# Patient Record
Sex: MALE | Race: WHITE | NOT HISPANIC OR LATINO | Employment: OTHER | URBAN - METROPOLITAN AREA
[De-identification: names, ages, dates, MRNs, and addresses within clinical notes are randomized per-mention and may not be internally consistent; named-entity substitution may affect disease eponyms.]

---

## 2017-03-21 ENCOUNTER — HOSPITAL ENCOUNTER (OUTPATIENT)
Dept: RADIOLOGY | Facility: MEDICAL CENTER | Age: 67
End: 2017-03-21
Attending: NEUROLOGICAL SURGERY
Payer: MEDICARE

## 2017-03-21 VITALS — HEIGHT: 72 IN | WEIGHT: 238 LBS | BODY MASS INDEX: 32.23 KG/M2

## 2017-03-21 DIAGNOSIS — M54.5 LOW BACK PAIN, UNSPECIFIED BACK PAIN LATERALITY, UNSPECIFIED CHRONICITY, WITH SCIATICA PRESENCE UNSPECIFIED: ICD-10-CM

## 2017-03-21 PROCEDURE — 72110 X-RAY EXAM L-2 SPINE 4/>VWS: CPT

## 2017-03-21 PROCEDURE — A9270 NON-COVERED ITEM OR SERVICE: HCPCS | Performed by: RADIOLOGY

## 2017-03-21 PROCEDURE — 700102 HCHG RX REV CODE 250 W/ 637 OVERRIDE(OP): Performed by: RADIOLOGY

## 2017-03-21 PROCEDURE — 72148 MRI LUMBAR SPINE W/O DYE: CPT

## 2017-03-21 RX ORDER — ALPRAZOLAM 1 MG/1
1 TABLET ORAL
Status: COMPLETED | OUTPATIENT
Start: 2017-03-21 | End: 2017-03-21

## 2017-03-21 RX ADMIN — ALPRAZOLAM 1 MG: 1 TABLET ORAL at 10:15

## 2017-03-21 ASSESSMENT — PAIN SCALES - GENERAL
PAINLEVEL_OUTOF10: 0
PAINLEVEL_OUTOF10: 0

## 2017-03-21 NOTE — IP AVS SNAPSHOT
" <p align=\"LEFT\"><IMG SRC=\"[email or web address]\" alt=\"Image\" WIDTH=\"50%\" HEIGHT=\"200\" BORDER=\"\"></p>      `           Juan Lubin   MRN: [id number]    Department:  [facility] - MRI [address]   Date of Visit:              `  Discharge Instructions       MRI ADULT DISCHARGE INSTRUCTIONS    You have been medicated today for your scan. Please follow the instructions below to ensure your safe recovery. If you have any questions or problems, feel free to call us at [phone] or [phone].     1.   Have someone stay with you to assist you as needed.    2.   Do not drive or operate any mechanical devices.    3.   Do not perform any activity that requires concentration. Make no major decisions over the next 24 hours.     4.   Be careful changing positions from laying down to sitting or standing, as you may become dizzy.     5.   Do not drink alcohol for 48 hours.    6.   There are no restrictions for eating your normal meals. Drink fluids.    7.   You may continue your usual medications for pain, tranquilizers, muscle relaxants or sedatives when awake.     8.   Tomorrow, you may continue your normal daily activities.     9.   Pressure dressing on 10 - 15 minutes. If swelling or bleeding occurs when removed, continue placing direct pressure on injection site for another 5 minutes, or until bleeding stops.     I have been informed of and understand the above discharge instructions. Alprazolam (XANAX) tablet  What is this medicine?  You were prescribed ALPRAZOLAM (al PRAMILLY mixon black) for the procedure you had today. This medication is a benzodiazepine. It is used to treat anxiety and panic attacks.  This medicine may be used for other purposes; ask your health care provider or pharmacist if you have questions.  What side effects may I notice from receiving this medicine?  Side effects that you should report to your doctor or health care professional as soon as possible:  • allergic reactions like skin rash, " itching or hives, swelling of the face, lips, or tongue  • confusion, forgetfulness  • depression  • difficulty sleeping  • difficulty speaking  • feeling faint or lightheaded, falls  • mood changes, excitability or aggressive behavior  • muscle cramps  • trouble passing urine or change in the amount of urine  • unusually weak or tired  Side effects that usually do not require medical attention (report to your doctor or health care professional if they continue or are bothersome):  • changes in appetite  • change in sex drive or performance  This list may not describe all possible side effects. Call your doctor for medical advice about side effects. You may report side effects to FDA at 7-537-GER-0411.         `       Diet / Nutrition:    Follow any diet instructions given to you by your doctor or the dietician, including how much salt (sodium) you are allowed each day.    If you are overweight, talk to your doctor about a weight reduction plan.    Activity:    Remain physically active following your doctor's instructions about exercise and activity.    Rest often.     Any time you become even a little tired or short of breath, SIT DOWN and rest.    Worsening Symptoms:    Report any of the following signs and symptoms to the doctor's office immediately:    *Pain of jaw, arm, or neck  *Chest pain not relieved by medication                               *Dizziness or loss of consciousness  *Difficulty breathing even when at rest   *More tired than usual                                       *Bleeding drainage or swelling of surgical site  *Swelling of feet, ankles, legs or stomach                 *Fever (>100ºF)  *Pink or blood tinged sputum  *Weight gain (3lbs/day or 5lbs /week)           *Shock from internal defibrillator (if applicable)  *Palpitations or irregular heartbeats                *Cool and/or numb extremities    Stroke Awareness    Common Risk Factors for Stroke include:    Age  Atrial  Fibrillation  Carotid Artery Stenosis  Diabetes Mellitus  Excessive alcohol consumption  High blood pressure  Overweight   Physical inactivity  Smoking    Warning signs and symptoms of a stroke include:    *Sudden numbness or weakness of the face, arm or leg (especially on one side of the body).  *Sudden confusion, trouble speaking or understanding.  *Sudden trouble seeing in one or both eyes.  *Sudden trouble walking, dizziness, loss of balance or coordination.Sudden severe headache with no known cause.    It is very important to get treatment quickly when a stroke occurs. If you experience any of the above warning signs, call 911 immediately.                    `     Quit Smoking / Tobacco Use:    I understand the use of any tobacco products increases my chance of suffering from future heart disease or stroke and could cause other illnesses which may shorten my life. Quitting the use of tobacco products is the single most important thing I can do to improve my health. For further information on smoking / tobacco cessation call a Toll Free Quit Line at 1-696.818.4738 (*[employer]) or 1-368.459.3833 (American Lung Association) or you can access the web based program at www.lungMeeps.org.    [place] Tobacco Users Help Line:  (779) 770-5597       Toll Free: 1-470.490.2759  Quit Tobacco Program [facility] (468)716-1962    Crisis Hotline:    [place] Crisis Hotline:  5-244-WRJEDUV or 1-126.639.1126    [place] Crisis Hotline:    1-356.295.5531 or 311-239-1868    Discharge Survey:   Thank you for choosing [facility]. We hope we did everything we could to make your hospital stay a pleasant one. You may be receiving a phone survey and we would appreciate your time and participation in answering the questions. Your input is very valuable to us in our efforts to improve our service to our patients and their families.        My signature on this form indicates that:    1. I have reviewed  and understand the above information.  2. My questions regarding this information have been answered to my satisfaction.  3. I have formulated a plan with my discharge nurse to obtain my prescribed medications for home.                   `           Patient or Caregiver Signature:  ____________________________________________________________    Date:  ____________________________________________________________       `

## 2017-03-21 NOTE — DISCHARGE INSTRUCTIONS
MRI ADULT DISCHARGE INSTRUCTIONS    You have been medicated today for your scan. Please follow the instructions below to ensure your safe recovery. If you have any questions or problems, feel free to call us at [phone] or [phone].     1.   Have someone stay with you to assist you as needed.    2.   Do not drive or operate any mechanical devices.    3.   Do not perform any activity that requires concentration. Make no major decisions over the next 24 hours.     4.   Be careful changing positions from laying down to sitting or standing, as you may become dizzy.     5.   Do not drink alcohol for 48 hours.    6.   There are no restrictions for eating your normal meals. Drink fluids.    7.   You may continue your usual medications for pain, tranquilizers, muscle relaxants or sedatives when awake.     8.   Tomorrow, you may continue your normal daily activities.     9.   Pressure dressing on 10 - 15 minutes. If swelling or bleeding occurs when removed, continue placing direct pressure on injection site for another 5 minutes, or until bleeding stops.     I have been informed of and understand the above discharge instructions. Alprazolam (XANAX) tablet  What is this medicine?  You were prescribed ALPRAZOLAM (al PRAY apurva black) for the procedure you had today. This medication is a benzodiazepine. It is used to treat anxiety and panic attacks.  This medicine may be used for other purposes; ask your health care provider or pharmacist if you have questions.  What side effects may I notice from receiving this medicine?  Side effects that you should report to your doctor or health care professional as soon as possible:  • allergic reactions like skin rash, itching or hives, swelling of the face, lips, or tongue  • confusion, forgetfulness  • depression  • difficulty sleeping  • difficulty speaking  • feeling faint or lightheaded, falls  • mood changes, excitability or aggressive behavior  • muscle cramps  • trouble passing urine  or change in the amount of urine  • unusually weak or tired  Side effects that usually do not require medical attention (report to your doctor or health care professional if they continue or are bothersome):  • changes in appetite  • change in sex drive or performance  This list may not describe all possible side effects. Call your doctor for medical advice about side effects. You may report side effects to FDA at 3-631-EPI-5473.

## 2017-06-15 ENCOUNTER — HOSPITAL ENCOUNTER (OUTPATIENT)
Dept: LAB | Facility: MEDICAL CENTER | Age: 67
End: 2017-06-15
Attending: FAMILY MEDICINE
Payer: MEDICARE

## 2017-06-15 LAB
ALBUMIN SERPL BCP-MCNC: 4.2 G/DL (ref 3.2–4.9)
ALBUMIN/GLOB SERPL: 1.4 G/DL
ALP SERPL-CCNC: 59 U/L (ref 30–99)
ALT SERPL-CCNC: 38 U/L (ref 2–50)
ANION GAP SERPL CALC-SCNC: 8 MMOL/L (ref 0–11.9)
AST SERPL-CCNC: 36 U/L (ref 12–45)
BASOPHILS # BLD AUTO: 1.2 % (ref 0–1.8)
BASOPHILS # BLD: 0.09 K/UL (ref 0–0.12)
BILIRUB SERPL-MCNC: 0.4 MG/DL (ref 0.1–1.5)
BUN SERPL-MCNC: 14 MG/DL (ref 8–22)
CALCIUM SERPL-MCNC: 9.2 MG/DL (ref 8.5–10.5)
CHLORIDE SERPL-SCNC: 102 MMOL/L (ref 96–112)
CHOLEST SERPL-MCNC: 140 MG/DL (ref 100–199)
CO2 SERPL-SCNC: 31 MMOL/L (ref 20–33)
CREAT SERPL-MCNC: 1 MG/DL (ref 0.5–1.4)
EOSINOPHIL # BLD AUTO: 0.19 K/UL (ref 0–0.51)
EOSINOPHIL NFR BLD: 2.6 % (ref 0–6.9)
ERYTHROCYTE [DISTWIDTH] IN BLOOD BY AUTOMATED COUNT: 43.8 FL (ref 35.9–50)
EST. AVERAGE GLUCOSE BLD GHB EST-MCNC: 134 MG/DL
GFR SERPL CREATININE-BSD FRML MDRD: >60 ML/MIN/1.73 M 2
GLOBULIN SER CALC-MCNC: 3 G/DL (ref 1.9–3.5)
GLUCOSE SERPL-MCNC: 95 MG/DL (ref 65–99)
HBA1C MFR BLD: 6.3 % (ref 0–5.6)
HCT VFR BLD AUTO: 44.2 % (ref 42–52)
HDLC SERPL-MCNC: 49 MG/DL
HGB BLD-MCNC: 13.8 G/DL (ref 14–18)
IMM GRANULOCYTES # BLD AUTO: 0.01 K/UL (ref 0–0.11)
IMM GRANULOCYTES NFR BLD AUTO: 0.1 % (ref 0–0.9)
LDLC SERPL CALC-MCNC: 73 MG/DL
LYMPHOCYTES # BLD AUTO: 2.57 K/UL (ref 1–4.8)
LYMPHOCYTES NFR BLD: 34.8 % (ref 22–41)
MCH RBC QN AUTO: 28.3 PG (ref 27–33)
MCHC RBC AUTO-ENTMCNC: 31.2 G/DL (ref 33.7–35.3)
MCV RBC AUTO: 90.8 FL (ref 81.4–97.8)
MONOCYTES # BLD AUTO: 0.58 K/UL (ref 0–0.85)
MONOCYTES NFR BLD AUTO: 7.8 % (ref 0–13.4)
NEUTROPHILS # BLD AUTO: 3.95 K/UL (ref 1.82–7.42)
NEUTROPHILS NFR BLD: 53.5 % (ref 44–72)
NRBC # BLD AUTO: 0 K/UL
NRBC BLD AUTO-RTO: 0 /100 WBC
PLATELET # BLD AUTO: 161 K/UL (ref 164–446)
PMV BLD AUTO: 11.6 FL (ref 9–12.9)
POTASSIUM SERPL-SCNC: 4.1 MMOL/L (ref 3.6–5.5)
PROT SERPL-MCNC: 7.2 G/DL (ref 6–8.2)
PSA SERPL-MCNC: 0.36 NG/ML (ref 0–4)
RBC # BLD AUTO: 4.87 M/UL (ref 4.7–6.1)
SODIUM SERPL-SCNC: 141 MMOL/L (ref 135–145)
TRIGL SERPL-MCNC: 89 MG/DL (ref 0–149)
TSH SERPL DL<=0.005 MIU/L-ACNC: 3.19 UIU/ML (ref 0.3–3.7)
WBC # BLD AUTO: 7.4 K/UL (ref 4.8–10.8)

## 2017-06-15 PROCEDURE — 85025 COMPLETE CBC W/AUTO DIFF WBC: CPT

## 2017-06-15 PROCEDURE — 84153 ASSAY OF PSA TOTAL: CPT

## 2017-06-15 PROCEDURE — 84443 ASSAY THYROID STIM HORMONE: CPT

## 2017-06-15 PROCEDURE — 80053 COMPREHEN METABOLIC PANEL: CPT

## 2017-06-15 PROCEDURE — 80061 LIPID PANEL: CPT

## 2017-06-15 PROCEDURE — 36415 COLL VENOUS BLD VENIPUNCTURE: CPT

## 2017-06-15 PROCEDURE — 83036 HEMOGLOBIN GLYCOSYLATED A1C: CPT

## 2017-12-21 ENCOUNTER — HOSPITAL ENCOUNTER (OUTPATIENT)
Dept: LAB | Facility: MEDICAL CENTER | Age: 67
End: 2017-12-21
Attending: FAMILY MEDICINE
Payer: MEDICARE

## 2017-12-21 LAB
ALBUMIN SERPL BCP-MCNC: 4.3 G/DL (ref 3.2–4.9)
ALBUMIN/GLOB SERPL: 1.7 G/DL
ALP SERPL-CCNC: 48 U/L (ref 30–99)
ALT SERPL-CCNC: 31 U/L (ref 2–50)
ANION GAP SERPL CALC-SCNC: 7 MMOL/L (ref 0–11.9)
AST SERPL-CCNC: 27 U/L (ref 12–45)
BASOPHILS # BLD AUTO: 1.6 % (ref 0–1.8)
BASOPHILS # BLD: 0.09 K/UL (ref 0–0.12)
BILIRUB SERPL-MCNC: 0.7 MG/DL (ref 0.1–1.5)
BUN SERPL-MCNC: 12 MG/DL (ref 8–22)
CALCIUM SERPL-MCNC: 9.7 MG/DL (ref 8.5–10.5)
CHLORIDE SERPL-SCNC: 103 MMOL/L (ref 96–112)
CHOLEST SERPL-MCNC: 132 MG/DL (ref 100–199)
CO2 SERPL-SCNC: 32 MMOL/L (ref 20–33)
CREAT SERPL-MCNC: 0.93 MG/DL (ref 0.5–1.4)
EOSINOPHIL # BLD AUTO: 0.27 K/UL (ref 0–0.51)
EOSINOPHIL NFR BLD: 4.9 % (ref 0–6.9)
ERYTHROCYTE [DISTWIDTH] IN BLOOD BY AUTOMATED COUNT: 43.2 FL (ref 35.9–50)
EST. AVERAGE GLUCOSE BLD GHB EST-MCNC: 134 MG/DL
GFR SERPL CREATININE-BSD FRML MDRD: >60 ML/MIN/1.73 M 2
GLOBULIN SER CALC-MCNC: 2.6 G/DL (ref 1.9–3.5)
GLUCOSE SERPL-MCNC: 86 MG/DL (ref 65–99)
HBA1C MFR BLD: 6.3 % (ref 0–5.6)
HCT VFR BLD AUTO: 48.6 % (ref 42–52)
HDLC SERPL-MCNC: 38 MG/DL
HGB BLD-MCNC: 15.9 G/DL (ref 14–18)
IMM GRANULOCYTES # BLD AUTO: 0 K/UL (ref 0–0.11)
IMM GRANULOCYTES NFR BLD AUTO: 0 % (ref 0–0.9)
LDLC SERPL CALC-MCNC: 65 MG/DL
LYMPHOCYTES # BLD AUTO: 3.01 K/UL (ref 1–4.8)
LYMPHOCYTES NFR BLD: 54.9 % (ref 22–41)
MCH RBC QN AUTO: 29.7 PG (ref 27–33)
MCHC RBC AUTO-ENTMCNC: 32.7 G/DL (ref 33.7–35.3)
MCV RBC AUTO: 90.8 FL (ref 81.4–97.8)
MONOCYTES # BLD AUTO: 0.37 K/UL (ref 0–0.85)
MONOCYTES NFR BLD AUTO: 6.8 % (ref 0–13.4)
NEUTROPHILS # BLD AUTO: 1.74 K/UL (ref 1.82–7.42)
NEUTROPHILS NFR BLD: 31.8 % (ref 44–72)
NRBC # BLD AUTO: 0 K/UL
NRBC BLD-RTO: 0 /100 WBC
PLATELET # BLD AUTO: 139 K/UL (ref 164–446)
PMV BLD AUTO: 11.8 FL (ref 9–12.9)
POTASSIUM SERPL-SCNC: 4 MMOL/L (ref 3.6–5.5)
PROT SERPL-MCNC: 6.9 G/DL (ref 6–8.2)
RBC # BLD AUTO: 5.35 M/UL (ref 4.7–6.1)
SODIUM SERPL-SCNC: 142 MMOL/L (ref 135–145)
TRIGL SERPL-MCNC: 144 MG/DL (ref 0–149)
TSH SERPL DL<=0.005 MIU/L-ACNC: 3.64 UIU/ML (ref 0.38–5.33)
WBC # BLD AUTO: 5.5 K/UL (ref 4.8–10.8)

## 2017-12-21 PROCEDURE — 80061 LIPID PANEL: CPT

## 2017-12-21 PROCEDURE — 85025 COMPLETE CBC W/AUTO DIFF WBC: CPT

## 2017-12-21 PROCEDURE — 36415 COLL VENOUS BLD VENIPUNCTURE: CPT

## 2017-12-21 PROCEDURE — 83036 HEMOGLOBIN GLYCOSYLATED A1C: CPT

## 2017-12-21 PROCEDURE — 84443 ASSAY THYROID STIM HORMONE: CPT

## 2017-12-21 PROCEDURE — 80053 COMPREHEN METABOLIC PANEL: CPT

## 2018-03-19 ENCOUNTER — HOSPITAL ENCOUNTER (OUTPATIENT)
Dept: LAB | Facility: MEDICAL CENTER | Age: 68
End: 2018-03-19
Attending: FAMILY MEDICINE
Payer: MEDICARE

## 2018-03-19 PROCEDURE — 84402 ASSAY OF FREE TESTOSTERONE: CPT

## 2018-03-19 PROCEDURE — 84270 ASSAY OF SEX HORMONE GLOBUL: CPT

## 2018-03-19 PROCEDURE — 84403 ASSAY OF TOTAL TESTOSTERONE: CPT

## 2018-03-19 PROCEDURE — 36415 COLL VENOUS BLD VENIPUNCTURE: CPT

## 2018-03-21 LAB
SHBG SERPL-SCNC: 80 NMOL/L (ref 11–80)
TESTOST FREE MFR SERPL: 0.9 % (ref 1.6–2.9)
TESTOST FREE SERPL-MCNC: 8 PG/ML (ref 47–244)
TESTOST SERPL-MCNC: 87 NG/DL (ref 300–720)

## 2018-04-02 ENCOUNTER — HOSPITAL ENCOUNTER (OUTPATIENT)
Dept: LAB | Facility: MEDICAL CENTER | Age: 68
End: 2018-04-02
Attending: FAMILY MEDICINE
Payer: MEDICARE

## 2018-04-02 PROCEDURE — 84402 ASSAY OF FREE TESTOSTERONE: CPT

## 2018-04-02 PROCEDURE — 84403 ASSAY OF TOTAL TESTOSTERONE: CPT

## 2018-04-02 PROCEDURE — 84270 ASSAY OF SEX HORMONE GLOBUL: CPT

## 2018-04-02 PROCEDURE — 36415 COLL VENOUS BLD VENIPUNCTURE: CPT

## 2018-04-04 LAB
SHBG SERPL-SCNC: 72 NMOL/L (ref 11–80)
TESTOST FREE MFR SERPL: 1.1 % (ref 1.6–2.9)
TESTOST FREE SERPL-MCNC: 33 PG/ML (ref 47–244)
TESTOST SERPL-MCNC: 304 NG/DL (ref 300–720)

## 2018-05-31 ENCOUNTER — HOSPITAL ENCOUNTER (OUTPATIENT)
Dept: LAB | Facility: MEDICAL CENTER | Age: 68
End: 2018-05-31
Attending: FAMILY MEDICINE
Payer: MEDICARE

## 2018-06-01 ENCOUNTER — HOSPITAL ENCOUNTER (OUTPATIENT)
Dept: LAB | Facility: MEDICAL CENTER | Age: 68
End: 2018-06-01
Attending: FAMILY MEDICINE
Payer: MEDICARE

## 2018-06-01 LAB
ALBUMIN SERPL BCP-MCNC: 4.2 G/DL (ref 3.2–4.9)
ALBUMIN/GLOB SERPL: 1.7 G/DL
ALP SERPL-CCNC: 40 U/L (ref 30–99)
ALT SERPL-CCNC: 19 U/L (ref 2–50)
ANION GAP SERPL CALC-SCNC: 6 MMOL/L (ref 0–11.9)
AST SERPL-CCNC: 22 U/L (ref 12–45)
BASOPHILS # BLD AUTO: 1.3 % (ref 0–1.8)
BASOPHILS # BLD: 0.09 K/UL (ref 0–0.12)
BILIRUB SERPL-MCNC: 0.6 MG/DL (ref 0.1–1.5)
BUN SERPL-MCNC: 9 MG/DL (ref 8–22)
CALCIUM SERPL-MCNC: 9.3 MG/DL (ref 8.5–10.5)
CHLORIDE SERPL-SCNC: 100 MMOL/L (ref 96–112)
CHOLEST SERPL-MCNC: 138 MG/DL (ref 100–199)
CO2 SERPL-SCNC: 34 MMOL/L (ref 20–33)
CREAT SERPL-MCNC: 1.11 MG/DL (ref 0.5–1.4)
EOSINOPHIL # BLD AUTO: 0.31 K/UL (ref 0–0.51)
EOSINOPHIL NFR BLD: 4.4 % (ref 0–6.9)
ERYTHROCYTE [DISTWIDTH] IN BLOOD BY AUTOMATED COUNT: 50.1 FL (ref 35.9–50)
EST. AVERAGE GLUCOSE BLD GHB EST-MCNC: 120 MG/DL
GLOBULIN SER CALC-MCNC: 2.5 G/DL (ref 1.9–3.5)
GLUCOSE SERPL-MCNC: 81 MG/DL (ref 65–99)
HBA1C MFR BLD: 5.8 % (ref 0–5.6)
HCT VFR BLD AUTO: 51.3 % (ref 42–52)
HDLC SERPL-MCNC: 41 MG/DL
HGB BLD-MCNC: 16.2 G/DL (ref 14–18)
IMM GRANULOCYTES # BLD AUTO: 0.02 K/UL (ref 0–0.11)
IMM GRANULOCYTES NFR BLD AUTO: 0.3 % (ref 0–0.9)
LDLC SERPL CALC-MCNC: 73 MG/DL
LYMPHOCYTES # BLD AUTO: 2.29 K/UL (ref 1–4.8)
LYMPHOCYTES NFR BLD: 32.2 % (ref 22–41)
MCH RBC QN AUTO: 30.2 PG (ref 27–33)
MCHC RBC AUTO-ENTMCNC: 31.6 G/DL (ref 33.7–35.3)
MCV RBC AUTO: 95.7 FL (ref 81.4–97.8)
MONOCYTES # BLD AUTO: 0.47 K/UL (ref 0–0.85)
MONOCYTES NFR BLD AUTO: 6.6 % (ref 0–13.4)
NEUTROPHILS # BLD AUTO: 3.94 K/UL (ref 1.82–7.42)
NEUTROPHILS NFR BLD: 55.2 % (ref 44–72)
NRBC # BLD AUTO: 0 K/UL
NRBC BLD-RTO: 0 /100 WBC
PLATELET # BLD AUTO: 114 K/UL (ref 164–446)
PMV BLD AUTO: 11.6 FL (ref 9–12.9)
POTASSIUM SERPL-SCNC: 4.2 MMOL/L (ref 3.6–5.5)
PROT SERPL-MCNC: 6.7 G/DL (ref 6–8.2)
PSA SERPL-MCNC: 0.78 NG/ML (ref 0–4)
RBC # BLD AUTO: 5.36 M/UL (ref 4.7–6.1)
SODIUM SERPL-SCNC: 140 MMOL/L (ref 135–145)
TESTOST SERPL-MCNC: 543 NG/DL (ref 175–781)
TRIGL SERPL-MCNC: 120 MG/DL (ref 0–149)
WBC # BLD AUTO: 7.1 K/UL (ref 4.8–10.8)

## 2018-06-01 PROCEDURE — 84153 ASSAY OF PSA TOTAL: CPT

## 2018-06-01 PROCEDURE — 84402 ASSAY OF FREE TESTOSTERONE: CPT

## 2018-06-01 PROCEDURE — 84403 ASSAY OF TOTAL TESTOSTERONE: CPT

## 2018-06-01 PROCEDURE — 80061 LIPID PANEL: CPT

## 2018-06-01 PROCEDURE — 85025 COMPLETE CBC W/AUTO DIFF WBC: CPT

## 2018-06-01 PROCEDURE — 83036 HEMOGLOBIN GLYCOSYLATED A1C: CPT

## 2018-06-01 PROCEDURE — 36415 COLL VENOUS BLD VENIPUNCTURE: CPT

## 2018-06-01 PROCEDURE — 80053 COMPREHEN METABOLIC PANEL: CPT

## 2018-06-03 LAB — TESTOST FREE SERPL-MCNC: 82 PG/ML (ref 47–244)

## 2018-06-27 ENCOUNTER — HOSPITAL ENCOUNTER (OUTPATIENT)
Dept: LAB | Facility: MEDICAL CENTER | Age: 68
End: 2018-06-27
Attending: FAMILY MEDICINE
Payer: MEDICARE

## 2018-06-27 PROCEDURE — 36415 COLL VENOUS BLD VENIPUNCTURE: CPT

## 2018-06-27 PROCEDURE — 84270 ASSAY OF SEX HORMONE GLOBUL: CPT

## 2018-06-27 PROCEDURE — 84403 ASSAY OF TOTAL TESTOSTERONE: CPT

## 2018-06-28 LAB
SHBG SERPL-SCNC: 69 NMOL/L (ref 11–80)
TESTOST FREE MFR SERPL: 1.4 % (ref 1.6–2.9)
TESTOST FREE SERPL-MCNC: 190 PG/ML (ref 47–244)
TESTOST SERPL-MCNC: 1318 NG/DL (ref 300–720)

## 2018-08-27 ENCOUNTER — HOSPITAL ENCOUNTER (OUTPATIENT)
Dept: LAB | Facility: MEDICAL CENTER | Age: 68
End: 2018-08-27
Attending: FAMILY MEDICINE
Payer: MEDICARE

## 2018-08-27 LAB — TESTOST SERPL-MCNC: 170 NG/DL (ref 175–781)

## 2018-08-27 PROCEDURE — 36415 COLL VENOUS BLD VENIPUNCTURE: CPT

## 2018-08-27 PROCEDURE — 84403 ASSAY OF TOTAL TESTOSTERONE: CPT

## 2018-08-27 PROCEDURE — 84402 ASSAY OF FREE TESTOSTERONE: CPT

## 2018-08-29 LAB — TESTOST FREE SERPL-MCNC: 15 PG/ML (ref 47–244)

## 2018-11-28 ENCOUNTER — HOSPITAL ENCOUNTER (OUTPATIENT)
Facility: MEDICAL CENTER | Age: 68
End: 2018-11-28
Payer: MEDICARE

## 2018-11-28 PROCEDURE — 82274 ASSAY TEST FOR BLOOD FECAL: CPT

## 2018-11-30 ENCOUNTER — HOSPITAL ENCOUNTER (OUTPATIENT)
Dept: LAB | Facility: MEDICAL CENTER | Age: 68
End: 2018-11-30
Attending: FAMILY MEDICINE
Payer: MEDICARE

## 2018-11-30 LAB
ALBUMIN SERPL BCP-MCNC: 4.1 G/DL (ref 3.2–4.9)
ALBUMIN/GLOB SERPL: 1.6 G/DL
ALP SERPL-CCNC: 50 U/L (ref 30–99)
ALT SERPL-CCNC: 54 U/L (ref 2–50)
ANION GAP SERPL CALC-SCNC: 11 MMOL/L (ref 0–11.9)
AST SERPL-CCNC: 29 U/L (ref 12–45)
BILIRUB SERPL-MCNC: 0.8 MG/DL (ref 0.1–1.5)
BUN SERPL-MCNC: 18 MG/DL (ref 8–22)
CALCIUM SERPL-MCNC: 9.3 MG/DL (ref 8.5–10.5)
CHLORIDE SERPL-SCNC: 102 MMOL/L (ref 96–112)
CHOLEST SERPL-MCNC: 121 MG/DL (ref 100–199)
CO2 SERPL-SCNC: 26 MMOL/L (ref 20–33)
CREAT SERPL-MCNC: 1.1 MG/DL (ref 0.5–1.4)
FASTING STATUS PATIENT QL REPORTED: NORMAL
GLOBULIN SER CALC-MCNC: 2.6 G/DL (ref 1.9–3.5)
GLUCOSE SERPL-MCNC: 166 MG/DL (ref 65–99)
HDLC SERPL-MCNC: 34 MG/DL
LDLC SERPL CALC-MCNC: 59 MG/DL
POTASSIUM SERPL-SCNC: 3.7 MMOL/L (ref 3.6–5.5)
PROT SERPL-MCNC: 6.7 G/DL (ref 6–8.2)
PSA SERPL-MCNC: 2.29 NG/ML (ref 0–4)
SODIUM SERPL-SCNC: 139 MMOL/L (ref 135–145)
TESTOST SERPL-MCNC: 557 NG/DL (ref 175–781)
TRIGL SERPL-MCNC: 138 MG/DL (ref 0–149)

## 2018-11-30 PROCEDURE — 84153 ASSAY OF PSA TOTAL: CPT

## 2018-11-30 PROCEDURE — 84403 ASSAY OF TOTAL TESTOSTERONE: CPT

## 2018-11-30 PROCEDURE — 36415 COLL VENOUS BLD VENIPUNCTURE: CPT

## 2018-11-30 PROCEDURE — 80053 COMPREHEN METABOLIC PANEL: CPT

## 2018-11-30 PROCEDURE — 84402 ASSAY OF FREE TESTOSTERONE: CPT

## 2018-11-30 PROCEDURE — 80061 LIPID PANEL: CPT

## 2018-12-01 LAB — TESTOST FREE SERPL-MCNC: 93 PG/ML (ref 47–244)

## 2018-12-04 LAB — HEMOCCULT STL QL IA: NEGATIVE

## 2019-02-05 ENCOUNTER — HOSPITAL ENCOUNTER (OUTPATIENT)
Dept: LAB | Facility: MEDICAL CENTER | Age: 69
End: 2019-02-05
Attending: FAMILY MEDICINE
Payer: MEDICARE

## 2019-02-05 LAB
CREAT UR-MCNC: 378.7 MG/DL
MICROALBUMIN UR-MCNC: 5.3 MG/DL
MICROALBUMIN/CREAT UR: 14 MG/G (ref 0–30)

## 2019-02-05 PROCEDURE — 82043 UR ALBUMIN QUANTITATIVE: CPT

## 2019-02-05 PROCEDURE — 82570 ASSAY OF URINE CREATININE: CPT

## 2019-04-02 ENCOUNTER — HOSPITAL ENCOUNTER (OUTPATIENT)
Dept: LAB | Facility: MEDICAL CENTER | Age: 69
End: 2019-04-02
Attending: FAMILY MEDICINE
Payer: MEDICARE

## 2019-04-02 LAB — TESTOST SERPL-MCNC: 736 NG/DL (ref 175–781)

## 2019-04-02 PROCEDURE — 84402 ASSAY OF FREE TESTOSTERONE: CPT

## 2019-04-02 PROCEDURE — 84403 ASSAY OF TOTAL TESTOSTERONE: CPT

## 2019-04-02 PROCEDURE — 36415 COLL VENOUS BLD VENIPUNCTURE: CPT

## 2019-04-04 LAB — TESTOST FREE SERPL-MCNC: 122 PG/ML (ref 47–244)

## 2019-09-12 ENCOUNTER — HOSPITAL ENCOUNTER (OUTPATIENT)
Dept: LAB | Facility: MEDICAL CENTER | Age: 69
End: 2019-09-12
Attending: FAMILY MEDICINE
Payer: MEDICARE

## 2019-09-12 LAB
ALBUMIN SERPL BCP-MCNC: 4.6 G/DL (ref 3.2–4.9)
ALBUMIN/GLOB SERPL: 1.6 G/DL
ALP SERPL-CCNC: 43 U/L (ref 30–99)
ALT SERPL-CCNC: 40 U/L (ref 2–50)
ANION GAP SERPL CALC-SCNC: 12 MMOL/L (ref 0–11.9)
AST SERPL-CCNC: 47 U/L (ref 12–45)
BASOPHILS # BLD AUTO: 1 % (ref 0–1.8)
BASOPHILS # BLD: 0.12 K/UL (ref 0–0.12)
BILIRUB SERPL-MCNC: 1 MG/DL (ref 0.1–1.5)
BUN SERPL-MCNC: 25 MG/DL (ref 8–22)
CALCIUM SERPL-MCNC: 9.3 MG/DL (ref 8.5–10.5)
CHLORIDE SERPL-SCNC: 103 MMOL/L (ref 96–112)
CHOLEST SERPL-MCNC: 150 MG/DL (ref 100–199)
CO2 SERPL-SCNC: 29 MMOL/L (ref 20–33)
CREAT SERPL-MCNC: 1.08 MG/DL (ref 0.5–1.4)
EOSINOPHIL # BLD AUTO: 0.08 K/UL (ref 0–0.51)
EOSINOPHIL NFR BLD: 0.7 % (ref 0–6.9)
ERYTHROCYTE [DISTWIDTH] IN BLOOD BY AUTOMATED COUNT: 55.8 FL (ref 35.9–50)
GLOBULIN SER CALC-MCNC: 2.8 G/DL (ref 1.9–3.5)
GLUCOSE SERPL-MCNC: 92 MG/DL (ref 65–99)
HCT VFR BLD AUTO: 54.4 % (ref 42–52)
HDLC SERPL-MCNC: 40 MG/DL
HGB BLD-MCNC: 16.7 G/DL (ref 14–18)
IMM GRANULOCYTES # BLD AUTO: 0.05 K/UL (ref 0–0.11)
IMM GRANULOCYTES NFR BLD AUTO: 0.4 % (ref 0–0.9)
LDLC SERPL CALC-MCNC: 87 MG/DL
LYMPHOCYTES # BLD AUTO: 3.56 K/UL (ref 1–4.8)
LYMPHOCYTES NFR BLD: 30.6 % (ref 22–41)
MCH RBC QN AUTO: 29 PG (ref 27–33)
MCHC RBC AUTO-ENTMCNC: 30.7 G/DL (ref 33.7–35.3)
MCV RBC AUTO: 94.6 FL (ref 81.4–97.8)
MONOCYTES # BLD AUTO: 0.8 K/UL (ref 0–0.85)
MONOCYTES NFR BLD AUTO: 6.9 % (ref 0–13.4)
NEUTROPHILS # BLD AUTO: 7.02 K/UL (ref 1.82–7.42)
NEUTROPHILS NFR BLD: 60.4 % (ref 44–72)
NRBC # BLD AUTO: 0 K/UL
NRBC BLD-RTO: 0 /100 WBC
PLATELET # BLD AUTO: 168 K/UL (ref 164–446)
PMV BLD AUTO: 11.4 FL (ref 9–12.9)
POTASSIUM SERPL-SCNC: 4.2 MMOL/L (ref 3.6–5.5)
PROT SERPL-MCNC: 7.4 G/DL (ref 6–8.2)
PSA SERPL-MCNC: 1.01 NG/ML (ref 0–4)
RBC # BLD AUTO: 5.75 M/UL (ref 4.7–6.1)
SODIUM SERPL-SCNC: 144 MMOL/L (ref 135–145)
TESTOST SERPL-MCNC: 579 NG/DL (ref 175–781)
TRIGL SERPL-MCNC: 117 MG/DL (ref 0–149)
WBC # BLD AUTO: 11.6 K/UL (ref 4.8–10.8)

## 2019-09-12 PROCEDURE — 85025 COMPLETE CBC W/AUTO DIFF WBC: CPT

## 2019-09-12 PROCEDURE — 80061 LIPID PANEL: CPT

## 2019-09-12 PROCEDURE — 80053 COMPREHEN METABOLIC PANEL: CPT

## 2019-09-12 PROCEDURE — 84403 ASSAY OF TOTAL TESTOSTERONE: CPT

## 2019-09-12 PROCEDURE — 84153 ASSAY OF PSA TOTAL: CPT

## 2019-09-12 PROCEDURE — 36415 COLL VENOUS BLD VENIPUNCTURE: CPT

## 2019-09-14 LAB — TESTOST FREE SERPL-MCNC: 92 PG/ML (ref 47–244)

## 2019-11-18 ENCOUNTER — HOSPITAL ENCOUNTER (OUTPATIENT)
Dept: RADIOLOGY | Facility: MEDICAL CENTER | Age: 69
End: 2019-11-18
Attending: PHYSICIAN ASSISTANT
Payer: MEDICARE

## 2019-11-18 DIAGNOSIS — M96.1 LUMBAR POSTLAMINECTOMY SYNDROME: ICD-10-CM

## 2019-11-18 PROCEDURE — 72202 X-RAY EXAM SI JOINTS 3/> VWS: CPT

## 2019-11-18 PROCEDURE — 72100 X-RAY EXAM L-S SPINE 2/3 VWS: CPT

## 2020-09-01 ENCOUNTER — HOSPITAL ENCOUNTER (OUTPATIENT)
Dept: LAB | Facility: MEDICAL CENTER | Age: 70
End: 2020-09-01
Attending: FAMILY MEDICINE
Payer: MEDICARE

## 2020-09-01 LAB — PSA SERPL-MCNC: 0.65 NG/ML (ref 0–4)

## 2020-09-01 PROCEDURE — 84153 ASSAY OF PSA TOTAL: CPT

## 2020-09-01 PROCEDURE — 80053 COMPREHEN METABOLIC PANEL: CPT

## 2020-09-01 PROCEDURE — 80061 LIPID PANEL: CPT

## 2020-09-01 PROCEDURE — 36415 COLL VENOUS BLD VENIPUNCTURE: CPT

## 2020-09-02 LAB
ALBUMIN SERPL BCP-MCNC: 4.4 G/DL (ref 3.2–4.9)
ALBUMIN/GLOB SERPL: 1.9 G/DL
ALP SERPL-CCNC: 60 U/L (ref 30–99)
ALT SERPL-CCNC: 34 U/L (ref 2–50)
ANION GAP SERPL CALC-SCNC: 12 MMOL/L (ref 7–16)
AST SERPL-CCNC: 28 U/L (ref 12–45)
BILIRUB SERPL-MCNC: 0.5 MG/DL (ref 0.1–1.5)
BUN SERPL-MCNC: 11 MG/DL (ref 8–22)
CALCIUM SERPL-MCNC: 9.6 MG/DL (ref 8.5–10.5)
CHLORIDE SERPL-SCNC: 100 MMOL/L (ref 96–112)
CHOLEST SERPL-MCNC: 181 MG/DL (ref 100–199)
CO2 SERPL-SCNC: 27 MMOL/L (ref 20–33)
CREAT SERPL-MCNC: 0.96 MG/DL (ref 0.5–1.4)
GLOBULIN SER CALC-MCNC: 2.3 G/DL (ref 1.9–3.5)
GLUCOSE SERPL-MCNC: 128 MG/DL (ref 65–99)
HDLC SERPL-MCNC: 44 MG/DL
LDLC SERPL CALC-MCNC: 98 MG/DL
POTASSIUM SERPL-SCNC: 4.4 MMOL/L (ref 3.6–5.5)
PROT SERPL-MCNC: 6.7 G/DL (ref 6–8.2)
SODIUM SERPL-SCNC: 139 MMOL/L (ref 135–145)
TRIGL SERPL-MCNC: 195 MG/DL (ref 0–149)

## 2020-10-02 ENCOUNTER — HOSPITAL ENCOUNTER (OUTPATIENT)
Dept: RADIOLOGY | Facility: MEDICAL CENTER | Age: 70
End: 2020-10-02
Attending: FAMILY MEDICINE
Payer: MEDICARE

## 2020-10-02 DIAGNOSIS — M54.16 LUMBAR RADICULOPATHY: ICD-10-CM

## 2020-10-02 PROCEDURE — 72148 MRI LUMBAR SPINE W/O DYE: CPT

## 2020-12-03 ENCOUNTER — HOSPITAL ENCOUNTER (OUTPATIENT)
Dept: LAB | Facility: MEDICAL CENTER | Age: 70
End: 2020-12-03
Attending: FAMILY MEDICINE
Payer: MEDICARE

## 2020-12-03 LAB
ALBUMIN SERPL BCP-MCNC: 4.4 G/DL (ref 3.2–4.9)
ALBUMIN/GLOB SERPL: 1.6 G/DL
ALP SERPL-CCNC: 64 U/L (ref 30–99)
ALT SERPL-CCNC: 40 U/L (ref 2–50)
ANION GAP SERPL CALC-SCNC: 9 MMOL/L (ref 7–16)
AST SERPL-CCNC: 33 U/L (ref 12–45)
BASOPHILS # BLD AUTO: 1.1 % (ref 0–1.8)
BASOPHILS # BLD: 0.06 K/UL (ref 0–0.12)
BILIRUB SERPL-MCNC: 0.6 MG/DL (ref 0.1–1.5)
BUN SERPL-MCNC: 12 MG/DL (ref 8–22)
CALCIUM SERPL-MCNC: 9.5 MG/DL (ref 8.5–10.5)
CHLORIDE SERPL-SCNC: 102 MMOL/L (ref 96–112)
CHOLEST SERPL-MCNC: 152 MG/DL (ref 100–199)
CO2 SERPL-SCNC: 28 MMOL/L (ref 20–33)
CREAT SERPL-MCNC: 0.94 MG/DL (ref 0.5–1.4)
EOSINOPHIL # BLD AUTO: 0.17 K/UL (ref 0–0.51)
EOSINOPHIL NFR BLD: 3 % (ref 0–6.9)
ERYTHROCYTE [DISTWIDTH] IN BLOOD BY AUTOMATED COUNT: 41.1 FL (ref 35.9–50)
EST. AVERAGE GLUCOSE BLD GHB EST-MCNC: 126 MG/DL
GLOBULIN SER CALC-MCNC: 2.7 G/DL (ref 1.9–3.5)
GLUCOSE SERPL-MCNC: 96 MG/DL (ref 65–99)
HBA1C MFR BLD: 6 % (ref 0–5.6)
HCT VFR BLD AUTO: 49.1 % (ref 42–52)
HDLC SERPL-MCNC: 38 MG/DL
HGB BLD-MCNC: 16.3 G/DL (ref 14–18)
IMM GRANULOCYTES # BLD AUTO: 0.01 K/UL (ref 0–0.11)
IMM GRANULOCYTES NFR BLD AUTO: 0.2 % (ref 0–0.9)
LDLC SERPL CALC-MCNC: 88 MG/DL
LYMPHOCYTES # BLD AUTO: 2.88 K/UL (ref 1–4.8)
LYMPHOCYTES NFR BLD: 51.4 % (ref 22–41)
MCH RBC QN AUTO: 29.6 PG (ref 27–33)
MCHC RBC AUTO-ENTMCNC: 33.2 G/DL (ref 33.7–35.3)
MCV RBC AUTO: 89.1 FL (ref 81.4–97.8)
MONOCYTES # BLD AUTO: 0.47 K/UL (ref 0–0.85)
MONOCYTES NFR BLD AUTO: 8.4 % (ref 0–13.4)
NEUTROPHILS # BLD AUTO: 2.01 K/UL (ref 1.82–7.42)
NEUTROPHILS NFR BLD: 35.9 % (ref 44–72)
NRBC # BLD AUTO: 0 K/UL
NRBC BLD-RTO: 0 /100 WBC
PLATELET # BLD AUTO: 140 K/UL (ref 164–446)
PMV BLD AUTO: 11.5 FL (ref 9–12.9)
POTASSIUM SERPL-SCNC: 4.1 MMOL/L (ref 3.6–5.5)
PROT SERPL-MCNC: 7.1 G/DL (ref 6–8.2)
PSA SERPL-MCNC: 0.76 NG/ML (ref 0–4)
RBC # BLD AUTO: 5.51 M/UL (ref 4.7–6.1)
SODIUM SERPL-SCNC: 139 MMOL/L (ref 135–145)
TRIGL SERPL-MCNC: 130 MG/DL (ref 0–149)
WBC # BLD AUTO: 5.6 K/UL (ref 4.8–10.8)

## 2020-12-03 PROCEDURE — 84153 ASSAY OF PSA TOTAL: CPT

## 2020-12-03 PROCEDURE — 80053 COMPREHEN METABOLIC PANEL: CPT

## 2020-12-03 PROCEDURE — 36415 COLL VENOUS BLD VENIPUNCTURE: CPT

## 2020-12-03 PROCEDURE — 80061 LIPID PANEL: CPT

## 2020-12-03 PROCEDURE — 85025 COMPLETE CBC W/AUTO DIFF WBC: CPT

## 2020-12-03 PROCEDURE — 83036 HEMOGLOBIN GLYCOSYLATED A1C: CPT

## 2020-12-15 ENCOUNTER — HOSPITAL ENCOUNTER (OUTPATIENT)
Dept: RADIOLOGY | Facility: MEDICAL CENTER | Age: 70
End: 2020-12-15
Attending: PAIN MEDICINE
Payer: MEDICARE

## 2020-12-15 DIAGNOSIS — R29.818 NEUROGENIC CLAUDICATION: ICD-10-CM

## 2020-12-15 PROCEDURE — 72110 X-RAY EXAM L-2 SPINE 4/>VWS: CPT

## 2021-03-03 DIAGNOSIS — Z23 NEED FOR VACCINATION: ICD-10-CM

## 2021-03-08 ENCOUNTER — HOSPITAL ENCOUNTER (OUTPATIENT)
Dept: LAB | Facility: MEDICAL CENTER | Age: 71
End: 2021-03-08
Attending: FAMILY MEDICINE
Payer: MEDICARE

## 2021-03-08 LAB
ALBUMIN SERPL BCP-MCNC: 4.6 G/DL (ref 3.2–4.9)
ALBUMIN/GLOB SERPL: 1.5 G/DL
ALP SERPL-CCNC: 74 U/L (ref 30–99)
ALT SERPL-CCNC: 43 U/L (ref 2–50)
ANION GAP SERPL CALC-SCNC: 11 MMOL/L (ref 7–16)
AST SERPL-CCNC: 34 U/L (ref 12–45)
BILIRUB SERPL-MCNC: 0.4 MG/DL (ref 0.1–1.5)
BUN SERPL-MCNC: 12 MG/DL (ref 8–22)
CALCIUM SERPL-MCNC: 9.9 MG/DL (ref 8.5–10.5)
CHLORIDE SERPL-SCNC: 101 MMOL/L (ref 96–112)
CHOLEST SERPL-MCNC: 172 MG/DL (ref 100–199)
CO2 SERPL-SCNC: 28 MMOL/L (ref 20–33)
CREAT SERPL-MCNC: 1.34 MG/DL (ref 0.5–1.4)
EST. AVERAGE GLUCOSE BLD GHB EST-MCNC: 123 MG/DL
FASTING STATUS PATIENT QL REPORTED: NORMAL
GLOBULIN SER CALC-MCNC: 3 G/DL (ref 1.9–3.5)
GLUCOSE SERPL-MCNC: 127 MG/DL (ref 65–99)
HBA1C MFR BLD: 5.9 % (ref 4–5.6)
HDLC SERPL-MCNC: 45 MG/DL
LDLC SERPL CALC-MCNC: 100 MG/DL
POTASSIUM SERPL-SCNC: 4.9 MMOL/L (ref 3.6–5.5)
PROT SERPL-MCNC: 7.6 G/DL (ref 6–8.2)
SODIUM SERPL-SCNC: 140 MMOL/L (ref 135–145)
TRIGL SERPL-MCNC: 137 MG/DL (ref 0–149)

## 2021-03-08 PROCEDURE — 80053 COMPREHEN METABOLIC PANEL: CPT

## 2021-03-08 PROCEDURE — 80061 LIPID PANEL: CPT

## 2021-03-08 PROCEDURE — 83036 HEMOGLOBIN GLYCOSYLATED A1C: CPT

## 2021-03-08 PROCEDURE — 36415 COLL VENOUS BLD VENIPUNCTURE: CPT

## 2021-05-27 ENCOUNTER — HOSPITAL ENCOUNTER (OUTPATIENT)
Dept: LAB | Facility: MEDICAL CENTER | Age: 71
End: 2021-05-27
Attending: FAMILY MEDICINE
Payer: MEDICARE

## 2021-05-27 LAB
ALBUMIN SERPL BCP-MCNC: 4.2 G/DL (ref 3.2–4.9)
ALBUMIN/GLOB SERPL: 1.5 G/DL
ALP SERPL-CCNC: 70 U/L (ref 30–99)
ALT SERPL-CCNC: 57 U/L (ref 2–50)
ANION GAP SERPL CALC-SCNC: 13 MMOL/L (ref 7–16)
AST SERPL-CCNC: 34 U/L (ref 12–45)
BILIRUB SERPL-MCNC: 0.3 MG/DL (ref 0.1–1.5)
BUN SERPL-MCNC: 14 MG/DL (ref 8–22)
CALCIUM SERPL-MCNC: 9.3 MG/DL (ref 8.5–10.5)
CHLORIDE SERPL-SCNC: 101 MMOL/L (ref 96–112)
CHOLEST SERPL-MCNC: 116 MG/DL (ref 100–199)
CO2 SERPL-SCNC: 25 MMOL/L (ref 20–33)
CREAT SERPL-MCNC: 0.94 MG/DL (ref 0.5–1.4)
FASTING STATUS PATIENT QL REPORTED: NORMAL
GLOBULIN SER CALC-MCNC: 2.8 G/DL (ref 1.9–3.5)
GLUCOSE SERPL-MCNC: 87 MG/DL (ref 65–99)
HDLC SERPL-MCNC: 40 MG/DL
LDLC SERPL CALC-MCNC: 55 MG/DL
POTASSIUM SERPL-SCNC: 4.3 MMOL/L (ref 3.6–5.5)
PROT SERPL-MCNC: 7 G/DL (ref 6–8.2)
SODIUM SERPL-SCNC: 139 MMOL/L (ref 135–145)
TRIGL SERPL-MCNC: 103 MG/DL (ref 0–149)

## 2021-05-27 PROCEDURE — 80061 LIPID PANEL: CPT

## 2021-05-27 PROCEDURE — 80053 COMPREHEN METABOLIC PANEL: CPT

## 2021-05-27 PROCEDURE — 36415 COLL VENOUS BLD VENIPUNCTURE: CPT

## 2021-07-06 ENCOUNTER — HOSPITAL ENCOUNTER (OUTPATIENT)
Dept: LAB | Facility: MEDICAL CENTER | Age: 71
End: 2021-07-06
Attending: FAMILY MEDICINE
Payer: MEDICARE

## 2021-07-06 PROCEDURE — 84443 ASSAY THYROID STIM HORMONE: CPT

## 2021-07-06 PROCEDURE — 36415 COLL VENOUS BLD VENIPUNCTURE: CPT

## 2021-07-07 LAB — TSH SERPL DL<=0.005 MIU/L-ACNC: 8.13 UIU/ML (ref 0.38–5.33)

## 2021-08-03 ENCOUNTER — PATIENT MESSAGE (OUTPATIENT)
Dept: HEALTH INFORMATION MANAGEMENT | Facility: OTHER | Age: 71
End: 2021-08-03

## 2021-10-12 ENCOUNTER — HOSPITAL ENCOUNTER (OUTPATIENT)
Dept: LAB | Facility: MEDICAL CENTER | Age: 71
End: 2021-10-12
Attending: FAMILY MEDICINE
Payer: MEDICARE

## 2021-10-12 LAB
ALBUMIN SERPL BCP-MCNC: 4.6 G/DL (ref 3.2–4.9)
ALBUMIN/GLOB SERPL: 1.6 G/DL
ALP SERPL-CCNC: 73 U/L (ref 30–99)
ALT SERPL-CCNC: 46 U/L (ref 2–50)
ANION GAP SERPL CALC-SCNC: 11 MMOL/L (ref 7–16)
AST SERPL-CCNC: 30 U/L (ref 12–45)
BASOPHILS # BLD AUTO: 1.5 % (ref 0–1.8)
BASOPHILS # BLD: 0.08 K/UL (ref 0–0.12)
BILIRUB SERPL-MCNC: 0.5 MG/DL (ref 0.1–1.5)
BUN SERPL-MCNC: 14 MG/DL (ref 8–22)
CALCIUM SERPL-MCNC: 9.8 MG/DL (ref 8.5–10.5)
CHLORIDE SERPL-SCNC: 102 MMOL/L (ref 96–112)
CHOLEST SERPL-MCNC: 128 MG/DL (ref 100–199)
CO2 SERPL-SCNC: 27 MMOL/L (ref 20–33)
CREAT SERPL-MCNC: 0.9 MG/DL (ref 0.5–1.4)
EOSINOPHIL # BLD AUTO: 0.21 K/UL (ref 0–0.51)
EOSINOPHIL NFR BLD: 3.9 % (ref 0–6.9)
ERYTHROCYTE [DISTWIDTH] IN BLOOD BY AUTOMATED COUNT: 41.6 FL (ref 35.9–50)
GLOBULIN SER CALC-MCNC: 2.8 G/DL (ref 1.9–3.5)
GLUCOSE SERPL-MCNC: 119 MG/DL (ref 65–99)
HCT VFR BLD AUTO: 47.2 % (ref 42–52)
HDLC SERPL-MCNC: 42 MG/DL
HGB BLD-MCNC: 15.6 G/DL (ref 14–18)
IMM GRANULOCYTES # BLD AUTO: 0.01 K/UL (ref 0–0.11)
IMM GRANULOCYTES NFR BLD AUTO: 0.2 % (ref 0–0.9)
LDLC SERPL CALC-MCNC: 59 MG/DL
LYMPHOCYTES # BLD AUTO: 1.96 K/UL (ref 1–4.8)
LYMPHOCYTES NFR BLD: 36 % (ref 22–41)
MCH RBC QN AUTO: 28.9 PG (ref 27–33)
MCHC RBC AUTO-ENTMCNC: 33.1 G/DL (ref 33.7–35.3)
MCV RBC AUTO: 87.6 FL (ref 81.4–97.8)
MONOCYTES # BLD AUTO: 0.48 K/UL (ref 0–0.85)
MONOCYTES NFR BLD AUTO: 8.8 % (ref 0–13.4)
NEUTROPHILS # BLD AUTO: 2.7 K/UL (ref 1.82–7.42)
NEUTROPHILS NFR BLD: 49.6 % (ref 44–72)
NRBC # BLD AUTO: 0 K/UL
NRBC BLD-RTO: 0 /100 WBC
PLATELET # BLD AUTO: 135 K/UL (ref 164–446)
PMV BLD AUTO: 11.3 FL (ref 9–12.9)
POTASSIUM SERPL-SCNC: 4.5 MMOL/L (ref 3.6–5.5)
PROT SERPL-MCNC: 7.4 G/DL (ref 6–8.2)
RBC # BLD AUTO: 5.39 M/UL (ref 4.7–6.1)
SODIUM SERPL-SCNC: 140 MMOL/L (ref 135–145)
TRIGL SERPL-MCNC: 135 MG/DL (ref 0–149)
TSH SERPL DL<=0.005 MIU/L-ACNC: 3.16 UIU/ML (ref 0.38–5.33)
WBC # BLD AUTO: 5.4 K/UL (ref 4.8–10.8)

## 2021-10-12 PROCEDURE — 84443 ASSAY THYROID STIM HORMONE: CPT

## 2021-10-12 PROCEDURE — 85025 COMPLETE CBC W/AUTO DIFF WBC: CPT

## 2021-10-12 PROCEDURE — 80061 LIPID PANEL: CPT

## 2021-10-12 PROCEDURE — 80053 COMPREHEN METABOLIC PANEL: CPT

## 2021-10-12 PROCEDURE — 36415 COLL VENOUS BLD VENIPUNCTURE: CPT

## 2021-11-03 ENCOUNTER — TELEPHONE (OUTPATIENT)
Dept: HEALTH INFORMATION MANAGEMENT | Facility: OTHER | Age: 71
End: 2021-11-03

## 2021-11-03 NOTE — TELEPHONE ENCOUNTER
Fecal Immunochemical test. Member stated he already received a kit and sent it out yesterday.  He cant recall who sent it to him.Member stated he has not had a colonoscopy and doesn't want to get one. Verified HIPAA, no questions or concerns.      Attempt #1

## 2021-11-09 ENCOUNTER — TELEPHONE (OUTPATIENT)
Dept: HEALTH INFORMATION MANAGEMENT | Facility: OTHER | Age: 71
End: 2021-11-09

## 2021-11-09 NOTE — TELEPHONE ENCOUNTER
Outcome: Warm transfer to Geriatric Specialty care to schedule in home Comprehensive Health Assessment.     Please transfer to Patient Outreach Team at [phone] when patient returns call.      [facility] Verified: yes    Attempt # 2

## 2021-11-16 PROBLEM — E03.8 OTHER SPECIFIED HYPOTHYROIDISM: Status: ACTIVE | Noted: 2021-11-16

## 2021-11-16 PROBLEM — F11.20 UNCOMPLICATED OPIOID DEPENDENCE (HCC): Status: ACTIVE | Noted: 2021-11-16

## 2021-11-16 PROBLEM — G47.09 OTHER INSOMNIA: Status: ACTIVE | Noted: 2021-11-16

## 2021-11-16 PROBLEM — F13.20 SEDATIVE, HYPNOTIC OR ANXIOLYTIC DEPENDENCE (HCC): Status: ACTIVE | Noted: 2021-11-16

## 2021-11-16 PROBLEM — F33.41 RECURRENT MAJOR DEPRESSIVE DISORDER, IN PARTIAL REMISSION (HCC): Status: ACTIVE | Noted: 2021-11-16

## 2021-11-16 PROBLEM — E78.49 OTHER HYPERLIPIDEMIA: Status: ACTIVE | Noted: 2021-11-16

## 2021-11-16 PROBLEM — F41.9 ANXIETY: Status: ACTIVE | Noted: 2021-11-16

## 2021-12-27 NOTE — PROGRESS NOTES
Cardiology Initial Consultation Note    Date of note:    ***  Primary Care Provider: Ricardo Dawson D.O.  Referring Provider: Ricardo Dawson D.*     Patient Name: Juan Lubin   YOB: 1950  MRN:              [id number]    Chief Complaint: Preop evaluation    History of Present Illness: Mr. Juan Lubin is a 71 y.o. male whose current medical problems include hypertension, dyslipidemia, and hypothyroidism who is here for cardiac consultation for preop evaluation.    The patient was referred to cardiology clinic for preop evaluation prior to dental treatment ***      Cardiovascular Risk Factors:  1. Smoking status: ***  2. Type II Diabetes Mellitus: ***   Lab Results   Component Value Date/Time    HBA1C 5.9 (H) 03/08/2021 12:22 PM    HBA1C 6.0 (H) 12/03/2020 10:01 AM     3. Hypertension: ***  4. Dyslipidemia: ***   Cholesterol,Tot   Date Value Ref Range Status   10/12/2021 128 100 - 199 mg/dL Final     LDL   Date Value Ref Range Status   10/12/2021 59 <100 mg/dL Final     HDL   Date Value Ref Range Status   10/12/2021 42 >=40 mg/dL Final     Triglycerides   Date Value Ref Range Status   10/12/2021 135 0 - 149 mg/dL Final     5. Family history of early Coronary Artery Disease in a first degree relative (Male less than 55 years of age; Female less than 65 years of age): ***  6.  Obesity and/or Metabolic Syndrome: ***  7. Sedentary lifestyle: ***    ROS      All other systems reviewed and are negative.       No past medical history on file.      No past surgical history on file.      Current Outpatient Medications   Medication Sig Dispense Refill   • amitriptyline (ELAVIL) 50 MG Tab Take 50 mg by mouth every day.     • Buprenorphine HCl-Naloxone HCl 8-2 MG FILM Take 2 mg by mouth 2 times a day.     • clonazePAM (KLONOPIN) 1 MG Tab Take 1.5 mg by mouth 2 times a day as needed.     • ezetimibe (ZETIA) 10 MG Tab Take 10 mg by mouth every day.     • gabapentin (NEURONTIN) 100 MG Cap Take 100 mg  by mouth in the morning, at noon, and at bedtime.     • methocarbamol (ROBAXIN) 500 MG Tab Take 500 mg by mouth every day.     • rosuvastatin (CRESTOR) 5 MG Tab Take 5 mg by mouth every day.     • traZODone (DESYREL) 50 MG Tab Take 50 mg by mouth at bedtime as needed.     • TRINTELLIX 20 MG Tab Take 20 mg by mouth every day.     • zolpidem (AMBIEN) 10 MG Tab Take 10 mg by mouth every day.     • buPROPion (WELLBUTRIN) 100 MG Tab Take 150 mg by mouth every day.     • levothyroxine (SYNTHROID) 75 MCG Tab Take 75 mcg by mouth every morning on an empty stomach.     • hydrOXYzine HCl (ATARAX) 10 MG Tab Take 10 mg by mouth 2 times a day as needed.     • omeprazole (PRILOSEC) 20 MG delayed-release capsule Take 20 mg by mouth every day.       No current facility-administered medications for this visit.         No Known Allergies      No family history on file.      Social History     Socioeconomic History   • Marital status:      Spouse name: Not on file   • Number of children: Not on file   • Years of education: Not on file   • Highest education level: Not on file   Occupational History   • Not on file   Tobacco Use   • Smoking status: Not on file   • Smokeless tobacco: Not on file   Substance and Sexual Activity   • Alcohol use: Not on file   • Drug use: Not on file   • Sexual activity: Not on file   Other Topics Concern   • Not on file   Social History Narrative   • Not on file     Social Determinants of Health     Financial Resource Strain:    • Difficulty of Paying Living Expenses: Not on file   Food Insecurity:    • Worried About Running Out of Food in the Last Year: Not on file   • Ran Out of Food in the Last Year: Not on file   Transportation Needs:    • Lack of Transportation (Medical): Not on file   • Lack of Transportation (Non-Medical): Not on file   Physical Activity:    • Days of Exercise per Week: Not on file   • Minutes of Exercise per Session: Not on file   Stress:    • Feeling of Stress : Not on  file   Social Connections:    • Frequency of Communication with Friends and Family: Not on file   • Frequency of Social Gatherings with Friends and Family: Not on file   • Attends Christian Services: Not on file   • Active Member of Clubs or Organizations: Not on file   • Attends Club or Organization Meetings: Not on file   • Marital Status: Not on file   Intimate Partner Violence:    • Fear of Current or Ex-Partner: Not on file   • Emotionally Abused: Not on file   • Physically Abused: Not on file   • Sexually Abused: Not on file   Housing Stability:    • Unable to Pay for Housing in the Last Year: Not on file   • Number of Places Lived in the Last Year: Not on file   • Unstable Housing in the Last Year: Not on file         Physical Exam:  Ambulatory Vitals  There were no vitals taken for this visit.   Oxygen Therapy:     BP Readings from Last 4 Encounters:   11/16/21 142/80       Weight/BMI: There is no height or weight on file to calculate BMI.  Wt Readings from Last 4 Encounters:   11/16/21 97.5 kg (215 lb)   03/21/17 108 kg (238 lb)         General: Well appearing and in no apparent distress  Eyes: ***nl conjunctiva, no icteric sclera  ENT: wearing a mask***, normal external appearance of ears  Neck: ***no visible JVP, *** no carotid bruits  Lungs: normal respiratory effort, CTAB  Heart: ***RRR, ***no murmurs, no rubs or gallops, *** no edema bilateral lower extremities. No LV/RV heave on cardiac palpatation. ***+ bilateral radial pulses.  ***+ bilateral dp pulses.   Abdomen: soft, non tender, non distended, no masses, normal bowel sounds.  No HSM.  Extremities/MSK: no clubbing, no cyanosis  Neurological: No focal sensory deficits  Psychiatric: Appropriate affect, A/O x 3, intact judgement and insight  Skin: Warm extremities      Lab Data Review:  Lab Results   Component Value Date/Time    CHOLSTRLTOT 128 10/12/2021 01:22 PM    LDL 59 10/12/2021 01:22 PM    HDL 42 10/12/2021 01:22 PM    TRIGLYCERIDE 135  10/12/2021 01:22 PM       Lab Results   Component Value Date/Time    SODIUM 140 10/12/2021 01:22 PM    POTASSIUM 4.5 10/12/2021 01:22 PM    CHLORIDE 102 10/12/2021 01:22 PM    CO2 27 10/12/2021 01:22 PM    GLUCOSE 119 (H) 10/12/2021 01:22 PM    BUN 14 10/12/2021 01:22 PM    CREATININE 0.90 10/12/2021 01:22 PM     Lab Results   Component Value Date/Time    ALKPHOSPHAT 73 10/12/2021 01:22 PM    ASTSGOT 30 10/12/2021 01:22 PM    ALTSGPT 46 10/12/2021 01:22 PM    TBILIRUBIN 0.5 10/12/2021 01:22 PM      Lab Results   Component Value Date/Time    WBC 5.4 10/12/2021 01:22 PM     Lab Results   Component Value Date/Time    HBA1C 5.9 (H) 03/08/2021 12:22 PM    HBA1C 6.0 (H) 12/03/2020 10:01 AM         Cardiac Imaging and Procedures Review:    EKG dated ***: My personal interpretation is ***    No prior echocardiogram    Assessment & Plan     No diagnosis found.      Shared Medical Decision Making:  ***    All of *** excellent questions were answered to the best of my knowledge and to *** satisfaction.  It was a pleasure seeing Mr. Juan Lubin in my clinic today. No follow-ups on file. Patient is aware to call the cardiology clinic with any questions or concerns.      Faye Austin MD  [facility], [address]  [address] [address]  Phone: [phone]  Fax: [phone]

## 2021-12-28 ENCOUNTER — TELEPHONE (OUTPATIENT)
Dept: CARDIOLOGY | Facility: MEDICAL CENTER | Age: 71
End: 2021-12-28

## 2021-12-28 NOTE — TELEPHONE ENCOUNTER
Spoke to pt in regards to obtaining records for NP appointment with [facility]. Per patient has been treated by a cardiologist at [facility] several years ago. Records requested. Confirmed all recent notes, labs, and cardiac imaging are in Epic. Confirmed with patient appointment date, time, and location.

## 2022-01-04 ENCOUNTER — APPOINTMENT (OUTPATIENT)
Dept: CARDIOLOGY | Facility: MEDICAL CENTER | Age: 72
End: 2022-01-04
Payer: MEDICARE

## 2022-01-06 ENCOUNTER — HOSPITAL ENCOUNTER (OUTPATIENT)
Dept: LAB | Facility: MEDICAL CENTER | Age: 72
End: 2022-01-06
Attending: FAMILY MEDICINE
Payer: MEDICARE

## 2022-01-06 LAB
ALBUMIN SERPL BCP-MCNC: 4.6 G/DL (ref 3.2–4.9)
ALBUMIN/GLOB SERPL: 1.7 G/DL
ALP SERPL-CCNC: 72 U/L (ref 30–99)
ALT SERPL-CCNC: 39 U/L (ref 2–50)
ANION GAP SERPL CALC-SCNC: 11 MMOL/L (ref 7–16)
AST SERPL-CCNC: 40 U/L (ref 12–45)
BILIRUB SERPL-MCNC: 0.5 MG/DL (ref 0.1–1.5)
BUN SERPL-MCNC: 11 MG/DL (ref 8–22)
CALCIUM SERPL-MCNC: 9.5 MG/DL (ref 8.5–10.5)
CHLORIDE SERPL-SCNC: 100 MMOL/L (ref 96–112)
CHOLEST SERPL-MCNC: 131 MG/DL (ref 100–199)
CO2 SERPL-SCNC: 28 MMOL/L (ref 20–33)
CREAT SERPL-MCNC: 1.04 MG/DL (ref 0.5–1.4)
EST. AVERAGE GLUCOSE BLD GHB EST-MCNC: 140 MG/DL
FASTING STATUS PATIENT QL REPORTED: NORMAL
GLOBULIN SER CALC-MCNC: 2.7 G/DL (ref 1.9–3.5)
GLUCOSE SERPL-MCNC: 115 MG/DL (ref 65–99)
HBA1C MFR BLD: 6.5 % (ref 4–5.6)
HDLC SERPL-MCNC: 48 MG/DL
LDLC SERPL CALC-MCNC: 61 MG/DL
POTASSIUM SERPL-SCNC: 4.1 MMOL/L (ref 3.6–5.5)
PROT SERPL-MCNC: 7.3 G/DL (ref 6–8.2)
PSA SERPL-MCNC: 0.93 NG/ML (ref 0–4)
SODIUM SERPL-SCNC: 139 MMOL/L (ref 135–145)
TRIGL SERPL-MCNC: 109 MG/DL (ref 0–149)

## 2022-01-06 PROCEDURE — 80053 COMPREHEN METABOLIC PANEL: CPT

## 2022-01-06 PROCEDURE — 83036 HEMOGLOBIN GLYCOSYLATED A1C: CPT

## 2022-01-06 PROCEDURE — 36415 COLL VENOUS BLD VENIPUNCTURE: CPT

## 2022-01-06 PROCEDURE — 80061 LIPID PANEL: CPT

## 2022-01-06 PROCEDURE — 84153 ASSAY OF PSA TOTAL: CPT

## 2022-04-06 ENCOUNTER — HOSPITAL ENCOUNTER (OUTPATIENT)
Dept: LAB | Facility: MEDICAL CENTER | Age: 72
End: 2022-04-06
Attending: FAMILY MEDICINE
Payer: MEDICARE

## 2022-04-06 LAB
ALBUMIN SERPL BCP-MCNC: 4.9 G/DL (ref 3.2–4.9)
ALBUMIN/GLOB SERPL: 1.7 G/DL
ALP SERPL-CCNC: 76 U/L (ref 30–99)
ALT SERPL-CCNC: 38 U/L (ref 2–50)
ANION GAP SERPL CALC-SCNC: 15 MMOL/L (ref 7–16)
AST SERPL-CCNC: 35 U/L (ref 12–45)
BILIRUB SERPL-MCNC: 0.5 MG/DL (ref 0.1–1.5)
BUN SERPL-MCNC: 12 MG/DL (ref 8–22)
CALCIUM SERPL-MCNC: 9.8 MG/DL (ref 8.5–10.5)
CHLORIDE SERPL-SCNC: 98 MMOL/L (ref 96–112)
CHOLEST SERPL-MCNC: 136 MG/DL (ref 100–199)
CO2 SERPL-SCNC: 23 MMOL/L (ref 20–33)
CREAT SERPL-MCNC: 1.08 MG/DL (ref 0.5–1.4)
EST. AVERAGE GLUCOSE BLD GHB EST-MCNC: 123 MG/DL
FASTING STATUS PATIENT QL REPORTED: NORMAL
GFR SERPLBLD CREATININE-BSD FMLA CKD-EPI: 73 ML/MIN/1.73 M 2
GLOBULIN SER CALC-MCNC: 2.9 G/DL (ref 1.9–3.5)
GLUCOSE SERPL-MCNC: 101 MG/DL (ref 65–99)
HBA1C MFR BLD: 5.9 % (ref 4–5.6)
HDLC SERPL-MCNC: 43 MG/DL
LDLC SERPL CALC-MCNC: 65 MG/DL
POTASSIUM SERPL-SCNC: 4.2 MMOL/L (ref 3.6–5.5)
PROT SERPL-MCNC: 7.8 G/DL (ref 6–8.2)
SODIUM SERPL-SCNC: 136 MMOL/L (ref 135–145)
TRIGL SERPL-MCNC: 142 MG/DL (ref 0–149)

## 2022-04-06 PROCEDURE — 80061 LIPID PANEL: CPT

## 2022-04-06 PROCEDURE — 36415 COLL VENOUS BLD VENIPUNCTURE: CPT

## 2022-04-06 PROCEDURE — 80053 COMPREHEN METABOLIC PANEL: CPT

## 2022-04-06 PROCEDURE — 83036 HEMOGLOBIN GLYCOSYLATED A1C: CPT

## 2022-08-11 ENCOUNTER — TELEPHONE (OUTPATIENT)
Dept: HEALTH INFORMATION MANAGEMENT | Facility: OTHER | Age: 72
End: 2022-08-11

## 2022-08-11 NOTE — TELEPHONE ENCOUNTER
Called to scheduled a COMPREHENSIVE HEALTH ASSESSMENT  Member answered call and stated he does not need anything and he hung up the call.  No opportunity to state the reason for the call

## 2023-01-20 ENCOUNTER — HOSPITAL ENCOUNTER (OUTPATIENT)
Dept: LAB | Facility: MEDICAL CENTER | Age: 73
End: 2023-01-20
Attending: FAMILY MEDICINE
Payer: MEDICARE

## 2023-01-20 LAB
ALBUMIN SERPL BCP-MCNC: 4.6 G/DL (ref 3.2–4.9)
ALBUMIN/GLOB SERPL: 2 G/DL
ALP SERPL-CCNC: 74 U/L (ref 30–99)
ALT SERPL-CCNC: 21 U/L (ref 2–50)
ANION GAP SERPL CALC-SCNC: 10 MMOL/L (ref 7–16)
AST SERPL-CCNC: 19 U/L (ref 12–45)
BASOPHILS # BLD AUTO: 1.3 % (ref 0–1.8)
BASOPHILS # BLD: 0.08 K/UL (ref 0–0.12)
BILIRUB SERPL-MCNC: 0.5 MG/DL (ref 0.1–1.5)
BUN SERPL-MCNC: 10 MG/DL (ref 8–22)
CALCIUM ALBUM COR SERPL-MCNC: 8.9 MG/DL (ref 8.5–10.5)
CALCIUM SERPL-MCNC: 9.4 MG/DL (ref 8.5–10.5)
CHLORIDE SERPL-SCNC: 105 MMOL/L (ref 96–112)
CHOLEST SERPL-MCNC: 120 MG/DL (ref 100–199)
CO2 SERPL-SCNC: 27 MMOL/L (ref 20–33)
CREAT SERPL-MCNC: 0.99 MG/DL (ref 0.5–1.4)
EOSINOPHIL # BLD AUTO: 0.27 K/UL (ref 0–0.51)
EOSINOPHIL NFR BLD: 4.5 % (ref 0–6.9)
ERYTHROCYTE [DISTWIDTH] IN BLOOD BY AUTOMATED COUNT: 41.4 FL (ref 35.9–50)
EST. AVERAGE GLUCOSE BLD GHB EST-MCNC: 114 MG/DL
FASTING STATUS PATIENT QL REPORTED: NORMAL
GFR SERPLBLD CREATININE-BSD FMLA CKD-EPI: 81 ML/MIN/1.73 M 2
GLOBULIN SER CALC-MCNC: 2.3 G/DL (ref 1.9–3.5)
GLUCOSE SERPL-MCNC: 110 MG/DL (ref 65–99)
HBA1C MFR BLD: 5.6 % (ref 4–5.6)
HCT VFR BLD AUTO: 47.3 % (ref 42–52)
HDLC SERPL-MCNC: 43 MG/DL
HGB BLD-MCNC: 15.8 G/DL (ref 14–18)
IMM GRANULOCYTES # BLD AUTO: 0.01 K/UL (ref 0–0.11)
IMM GRANULOCYTES NFR BLD AUTO: 0.2 % (ref 0–0.9)
LDLC SERPL CALC-MCNC: 57 MG/DL
LYMPHOCYTES # BLD AUTO: 2.26 K/UL (ref 1–4.8)
LYMPHOCYTES NFR BLD: 37.9 % (ref 22–41)
MCH RBC QN AUTO: 29.3 PG (ref 27–33)
MCHC RBC AUTO-ENTMCNC: 33.4 G/DL (ref 33.7–35.3)
MCV RBC AUTO: 87.6 FL (ref 81.4–97.8)
MONOCYTES # BLD AUTO: 0.45 K/UL (ref 0–0.85)
MONOCYTES NFR BLD AUTO: 7.6 % (ref 0–13.4)
NEUTROPHILS # BLD AUTO: 2.89 K/UL (ref 1.82–7.42)
NEUTROPHILS NFR BLD: 48.5 % (ref 44–72)
NRBC # BLD AUTO: 0 K/UL
NRBC BLD-RTO: 0 /100 WBC
PLATELET # BLD AUTO: 161 K/UL (ref 164–446)
PMV BLD AUTO: 11.3 FL (ref 9–12.9)
POTASSIUM SERPL-SCNC: 4.1 MMOL/L (ref 3.6–5.5)
PROT SERPL-MCNC: 6.9 G/DL (ref 6–8.2)
PSA SERPL-MCNC: 1.63 NG/ML (ref 0–4)
RBC # BLD AUTO: 5.4 M/UL (ref 4.7–6.1)
SODIUM SERPL-SCNC: 142 MMOL/L (ref 135–145)
TRIGL SERPL-MCNC: 100 MG/DL (ref 0–149)
WBC # BLD AUTO: 6 K/UL (ref 4.8–10.8)

## 2023-01-20 PROCEDURE — 80053 COMPREHEN METABOLIC PANEL: CPT

## 2023-01-20 PROCEDURE — 84153 ASSAY OF PSA TOTAL: CPT

## 2023-01-20 PROCEDURE — 36415 COLL VENOUS BLD VENIPUNCTURE: CPT

## 2023-01-20 PROCEDURE — 83036 HEMOGLOBIN GLYCOSYLATED A1C: CPT

## 2023-01-20 PROCEDURE — 80061 LIPID PANEL: CPT

## 2023-01-20 PROCEDURE — 85025 COMPLETE CBC W/AUTO DIFF WBC: CPT

## 2023-04-24 ENCOUNTER — HOSPITAL ENCOUNTER (EMERGENCY)
Facility: MEDICAL CENTER | Age: 73
End: 2023-04-27
Attending: EMERGENCY MEDICINE
Payer: MEDICARE

## 2023-04-24 DIAGNOSIS — M54.50 CHRONIC MIDLINE LOW BACK PAIN, UNSPECIFIED WHETHER SCIATICA PRESENT: ICD-10-CM

## 2023-04-24 DIAGNOSIS — G89.29 CHRONIC MIDLINE LOW BACK PAIN, UNSPECIFIED WHETHER SCIATICA PRESENT: ICD-10-CM

## 2023-04-24 DIAGNOSIS — R45.851 SUICIDAL IDEATION: ICD-10-CM

## 2023-04-24 LAB
AMPHET UR QL SCN: NEGATIVE
BARBITURATES UR QL SCN: NEGATIVE
BENZODIAZ UR QL SCN: NEGATIVE
BZE UR QL SCN: NEGATIVE
CANNABINOIDS UR QL SCN: NEGATIVE
METHADONE UR QL SCN: NEGATIVE
OPIATES UR QL SCN: NEGATIVE
OXYCODONE UR QL SCN: NEGATIVE
PCP UR QL SCN: NEGATIVE
POC BREATHALIZER: 0 PERCENT (ref 0–0.01)
POC BREATHALIZER: 0 PERCENT (ref 0–0.01)
PROPOXYPH UR QL SCN: NEGATIVE

## 2023-04-24 PROCEDURE — 99285 EMERGENCY DEPT VISIT HI MDM: CPT

## 2023-04-24 PROCEDURE — 302970 POC BREATHALIZER

## 2023-04-24 PROCEDURE — A9270 NON-COVERED ITEM OR SERVICE: HCPCS | Performed by: EMERGENCY MEDICINE

## 2023-04-24 PROCEDURE — 96372 THER/PROPH/DIAG INJ SC/IM: CPT

## 2023-04-24 PROCEDURE — 36415 COLL VENOUS BLD VENIPUNCTURE: CPT

## 2023-04-24 PROCEDURE — 700102 HCHG RX REV CODE 250 W/ 637 OVERRIDE(OP): Performed by: EMERGENCY MEDICINE

## 2023-04-24 PROCEDURE — 700111 HCHG RX REV CODE 636 W/ 250 OVERRIDE (IP): Performed by: EMERGENCY MEDICINE

## 2023-04-24 PROCEDURE — 302970 POC BREATHALIZER: Performed by: EMERGENCY MEDICINE

## 2023-04-24 PROCEDURE — 80307 DRUG TEST PRSMV CHEM ANLYZR: CPT

## 2023-04-24 RX ORDER — KETOROLAC TROMETHAMINE 30 MG/ML
15 INJECTION, SOLUTION INTRAMUSCULAR; INTRAVENOUS ONCE
Status: COMPLETED | OUTPATIENT
Start: 2023-04-24 | End: 2023-04-24

## 2023-04-24 RX ORDER — HYDROXYZINE HYDROCHLORIDE 25 MG/1
25 TABLET, FILM COATED ORAL ONCE
Status: COMPLETED | OUTPATIENT
Start: 2023-04-24 | End: 2023-04-24

## 2023-04-24 RX ORDER — HALOPERIDOL 5 MG/ML
5 INJECTION INTRAMUSCULAR ONCE
Status: COMPLETED | OUTPATIENT
Start: 2023-04-24 | End: 2023-04-24

## 2023-04-24 RX ORDER — IBUPROFEN 200 MG
800 TABLET ORAL 3 TIMES DAILY PRN
Status: SHIPPED | COMMUNITY
End: 2023-05-19

## 2023-04-24 RX ADMIN — HALOPERIDOL LACTATE 5 MG: 5 INJECTION, SOLUTION INTRAMUSCULAR at 23:19

## 2023-04-24 RX ADMIN — KETOROLAC TROMETHAMINE 15 MG: 30 INJECTION, SOLUTION INTRAMUSCULAR; INTRAVENOUS at 21:37

## 2023-04-24 RX ADMIN — HYDROXYZINE HYDROCHLORIDE 25 MG: 25 TABLET, FILM COATED ORAL at 19:43

## 2023-04-24 ASSESSMENT — PAIN DESCRIPTION - PAIN TYPE
TYPE: ACUTE PAIN
TYPE: ACUTE PAIN

## 2023-04-24 ASSESSMENT — FIBROSIS 4 INDEX: FIB4 SCORE: 1.85

## 2023-04-24 NOTE — ED NOTES
1:1 Observation. Continuous visual monitoring by Trained Personnel. Sitter has unobstructed view of patient at all times. Discussion with sitter about patient care, safety, and support.

## 2023-04-24 NOTE — ED NOTES
Respirations even/unlabored. All items removed from room for safety and to minimize risk of suicide. Verified that Legal Hold appropriate and signed in patient's chart. Confirmed patient's personal items removed from room, and if applicable labeled/placed in secure area.     Q15 observation, visual monitoring by Trained Personnel.
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
## 2023-04-24 NOTE — ED TRIAGE NOTES
"Chief Complaint   Patient presents with    Suicidal Ideation     Plan today, no attempt, gun at home, [facility] called 911, recent insomnia, not on current meds        BIB [facility] for above complaint. Pt with continued thoughts of SI, no attempt. Chronic pain. States has not taken any Rx meds for last x2 months.    SI protocols ordered, all personal items taken from pt. Sign placed on door.     BP (!) 169/80   Pulse 81   Temp 36.3 °C (97.3 °F) (Temporal)   Resp 16   Ht 1.803 m (5' 11\")   Wt 86.2 kg (190 lb)   SpO2 97%   BMI 26.50 kg/m²    "

## 2023-04-25 LAB
FLUAV RNA SPEC QL NAA+PROBE: NEGATIVE
FLUBV RNA SPEC QL NAA+PROBE: NEGATIVE
RSV RNA SPEC QL NAA+PROBE: NEGATIVE
SARS-COV-2 RNA RESP QL NAA+PROBE: NOTDETECTED
SPECIMEN SOURCE: NORMAL
TSH SERPL DL<=0.005 MIU/L-ACNC: 2.33 UIU/ML (ref 0.38–5.33)
VIT B12 SERPL-MCNC: 872 PG/ML (ref 211–911)

## 2023-04-25 PROCEDURE — 99285 EMERGENCY DEPT VISIT HI MDM: CPT | Mod: GC | Performed by: PSYCHIATRY & NEUROLOGY

## 2023-04-25 PROCEDURE — 700102 HCHG RX REV CODE 250 W/ 637 OVERRIDE(OP): Performed by: STUDENT IN AN ORGANIZED HEALTH CARE EDUCATION/TRAINING PROGRAM

## 2023-04-25 PROCEDURE — A9270 NON-COVERED ITEM OR SERVICE: HCPCS | Performed by: EMERGENCY MEDICINE

## 2023-04-25 PROCEDURE — 93005 ELECTROCARDIOGRAM TRACING: CPT | Performed by: STUDENT IN AN ORGANIZED HEALTH CARE EDUCATION/TRAINING PROGRAM

## 2023-04-25 PROCEDURE — A9270 NON-COVERED ITEM OR SERVICE: HCPCS | Performed by: STUDENT IN AN ORGANIZED HEALTH CARE EDUCATION/TRAINING PROGRAM

## 2023-04-25 PROCEDURE — 84443 ASSAY THYROID STIM HORMONE: CPT

## 2023-04-25 PROCEDURE — 82607 VITAMIN B-12: CPT

## 2023-04-25 PROCEDURE — 700111 HCHG RX REV CODE 636 W/ 250 OVERRIDE (IP): Performed by: EMERGENCY MEDICINE

## 2023-04-25 PROCEDURE — 96372 THER/PROPH/DIAG INJ SC/IM: CPT

## 2023-04-25 PROCEDURE — C9803 HOPD COVID-19 SPEC COLLECT: HCPCS | Performed by: EMERGENCY MEDICINE

## 2023-04-25 PROCEDURE — 0241U HCHG SARS-COV-2 COVID-19 NFCT DS RESP RNA 4 TRGT MIC: CPT

## 2023-04-25 PROCEDURE — 700102 HCHG RX REV CODE 250 W/ 637 OVERRIDE(OP): Performed by: EMERGENCY MEDICINE

## 2023-04-25 RX ORDER — HYDROXYZINE HYDROCHLORIDE 10 MG/1
10 TABLET, FILM COATED ORAL 2 TIMES DAILY PRN
Status: DISCONTINUED | OUTPATIENT
Start: 2023-04-25 | End: 2023-04-27 | Stop reason: HOSPADM

## 2023-04-25 RX ORDER — KETOROLAC TROMETHAMINE 30 MG/ML
15 INJECTION, SOLUTION INTRAMUSCULAR; INTRAVENOUS ONCE
Status: COMPLETED | OUTPATIENT
Start: 2023-04-25 | End: 2023-04-25

## 2023-04-25 RX ORDER — DIPHENHYDRAMINE HCL 25 MG
50 TABLET ORAL ONCE
Status: COMPLETED | OUTPATIENT
Start: 2023-04-25 | End: 2023-04-25

## 2023-04-25 RX ORDER — AMITRIPTYLINE HYDROCHLORIDE 10 MG/1
5 TABLET, FILM COATED ORAL EVERY EVENING
Status: DISCONTINUED | OUTPATIENT
Start: 2023-04-25 | End: 2023-04-26

## 2023-04-25 RX ORDER — ACETAMINOPHEN 325 MG/1
650 TABLET ORAL EVERY 6 HOURS PRN
Status: DISCONTINUED | OUTPATIENT
Start: 2023-04-25 | End: 2023-04-27 | Stop reason: HOSPADM

## 2023-04-25 RX ORDER — LORAZEPAM 1 MG/1
1 TABLET ORAL ONCE
Status: COMPLETED | OUTPATIENT
Start: 2023-04-25 | End: 2023-04-25

## 2023-04-25 RX ORDER — DIAZEPAM 5 MG/1
5 TABLET ORAL ONCE
Status: COMPLETED | OUTPATIENT
Start: 2023-04-25 | End: 2023-04-25

## 2023-04-25 RX ORDER — MELOXICAM 7.5 MG/1
7.5 TABLET ORAL
Status: DISCONTINUED | OUTPATIENT
Start: 2023-04-25 | End: 2023-04-27 | Stop reason: HOSPADM

## 2023-04-25 RX ADMIN — DIAZEPAM 5 MG: 5 TABLET ORAL at 22:23

## 2023-04-25 RX ADMIN — DIPHENHYDRAMINE HYDROCHLORIDE 50 MG: 25 TABLET ORAL at 00:22

## 2023-04-25 RX ADMIN — LORAZEPAM 1 MG: 1 TABLET ORAL at 02:38

## 2023-04-25 RX ADMIN — MELOXICAM 7.5 MG: 7.5 TABLET ORAL at 10:47

## 2023-04-25 RX ADMIN — KETOROLAC TROMETHAMINE 15 MG: 30 INJECTION, SOLUTION INTRAMUSCULAR at 20:46

## 2023-04-25 RX ADMIN — AMITRIPTYLINE HYDROCHLORIDE 5 MG: 10 TABLET, FILM COATED ORAL at 18:08

## 2023-04-25 RX ADMIN — HYDROXYZINE HYDROCHLORIDE 10 MG: 10 TABLET ORAL at 18:56

## 2023-04-25 ASSESSMENT — PAIN DESCRIPTION - PAIN TYPE: TYPE: ACUTE PAIN

## 2023-04-25 NOTE — ED NOTES
[facility]

## 2023-04-25 NOTE — PROGRESS NOTES
"ED Observation Progress Note    Date of Service: 04/25/23    Interval History and Interventions  The patient continues to hold in the emergency department awaiting placement in inpatient psychiatric facility for depression and suicidal ideation.  There have been no acute issues or events.  No acute complaints.  Awaiting final disposition.  Psych facility has requested COVID screening which has been ordered.  This remains pending.    Physical Exam  /70   Pulse 68   Temp 36.4 °C (97.6 °F) (Temporal)   Resp 16   Ht 1.803 m (5' 11\")   Wt 86.2 kg (190 lb)   SpO2 98%   BMI 26.50 kg/m² .    Constitutional: Awake and alert. Nontoxic  HENT:  Grossly normal  Eyes: Grossly normal  Neck: Normal range of motion  Cardiovascular: Normal heart rate   Thorax & Lungs: No respiratory distress  Abdomen: Nontender  Skin:  No pathologic rash.   Extremities: Well perfused  Psychiatric: Affect normal    Labs  Results for orders placed or performed during the hospital encounter of 04/24/23   Urine Drug Screen   Result Value Ref Range    Amphetamines Urine Negative Negative    Barbiturates Negative Negative    Benzodiazepines Negative Negative    Cocaine Metabolite Negative Negative    Methadone Negative Negative    Opiates Negative Negative    Oxycodone Negative Negative    Phencyclidine -Pcp Negative Negative    Propoxyphene Negative Negative    Cannabinoid Metab Negative Negative   POC BREATHALIZER   Result Value Ref Range    POC Breathalizer 0.000 0.00 - 0.01 Percent   POC Breathalizer   Result Value Ref Range    POC Breathalizer 0.000 0.00 - 0.01 Percent       Radiology  No orders to display       Problem List  1. Suicidal ideation    2. Chronic midline low back pain, unspecified whether sciatica present          Electronically signed by: Anton Mcfarland M.D., 4/25/2023 2:13 PM          "

## 2023-04-25 NOTE — ED NOTES
pt resting in room in 1:1 direct observation of staff sitter, L2K in chart, NAD, no needs or requests at this time.

## 2023-04-25 NOTE — ED NOTES
Bedside report received from Moreno SKAGGS, pt pacing in room in 1:1 direct observation of staff sitter, L2K in chart, NAD, no needs or requests at this time.

## 2023-04-25 NOTE — ED NOTES
Checked on bed, with unlabored respirations. No safety risk noted  Awake on bed, he said that he was able to sleep for few hours and feel better  Sitter - 1:1; Continued safety precaution  Flo in low position, side rail up for pt safety.   Will continue to monitor for any complications.

## 2023-04-25 NOTE — ED NOTES
Bedside report received from previous shift. Assumed patient care. Verified patient identification. Checked on bed, with unlabored respirations. Vital signs is stable. Denied any new complaints. Gurney in low position, side rail up for pt safety.     Sitter - 1:1; No safety risk noted. Continued safety precaution.     Will continue to monitor for any complications.

## 2023-04-25 NOTE — ED NOTES
Bedside report given to Marlene SKAGGS, pt resting on gurney in 1:1 direct obs of staff sitter, no needs or requests at this time.

## 2023-04-25 NOTE — DISCHARGE PLANNING
[facility] LOU Ferguson RN chart review completed. Collaborated with ED JANETTE Pierre , JANETTE Nicholas and [facility] Alert Team SHARIFA Norris  prior to meeting with the pt. The most current review of medical record, knowledge of pt's PLOF and social support, LACE+ score of 28 , 6 clicks scores of (not entered)  mobility were considered.      Attempted to see Pt  at bedside.However, pt is asleep; 1:1 Sitter .   Alert Team SHARIFA Norris requested for a single case agreement for [facility] for this pt. Given this, TCN notified [facility] HUM Team and requested ref#.     TCN will  now defer to Hospital Alert Team .Thank you.         ----ADDENDUM  4/25/2023  12:36 PM---  TCBLAIRE notified Hospital Alert Team Myrna regarding [facility] ref# provided by [facility] prior auth team Josiane WASHINGTON. Thank You

## 2023-04-25 NOTE — ED NOTES
"Med rec completed per patient at bedside.  Allergies reviewed with patient. ROXANA.  No outpatient antibiotics within the last 30 days.  Patient's preferred pharmacy: [facility].    Patient received a prescription for buprenorphine-naloxone on 4/12/2023. Patient states that he took this medication for 2 days after he received it, but that it did not help his back pain, so he stopped using it. Patient states that he also stopped using all of his other prescription medications over a month ago.    Patient unsure of the strength of his iron supplement, and states that he has not had it for \"a couple of weeks.\"  "

## 2023-04-25 NOTE — DISCHARGE PLANNING
[facility] ED Behavioral Health Fax Referral      [facility] ED Behavioral Health Alert Team:  [phone]    Referral: Legal Hold    Intervention: Patient referral to [facility] inpatient  facillity    Legal Hold Initiated: Date: 4/24/23 Time: 1500    Patient’s Insurance Listed on Face Sheet: [facility]    Referrals sent to: [facility], [facility]    Referrals faxed by  Myrna    This referral contains the following information:  Face sheet __x__  Page 1 and Page 2 of Legal Hold __x__  Alert Team Assessment/Psych Assessment _x___  Head to toe physical exam __x__  Urine Drug Screen __x__  Blood Alcohol _x___  Vital signs __x__  Pregnancy test when applicable ___  Medications list ____  Covid screening ____    Plan: Patient will transfer to mental health facility once acceptance is obtained

## 2023-04-25 NOTE — ED NOTES
Patient presented to the ER for complaints of depression / suicidality without a plan.   Patient's home medications have been reviewed by the pharmacy team.     History reviewed. No pertinent past medical history.    Patient's Medications   New Prescriptions    No medications on file   Previous Medications    BUPRENORPHINE HCL-NALOXONE HCL 8-2 MG FILM    Take 1 Film by mouth every day for 30 days.    FERROUS SULFATE (FEROSUL PO)    Take 1 Tablet by mouth every day.    IBUPROFEN (MOTRIN) 200 MG TAB    Take 600 mg by mouth 2 times a day as needed for Moderate Pain. 3 tablets = 600 mg.    MULTIVITAMIN TAB    Take 1 Tablet by mouth every day.   Modified Medications    No medications on file   Discontinued Medications    AMITRIPTYLINE (ELAVIL) 50 MG TAB    Take 50 mg by mouth every day.    BUPROPION (WELLBUTRIN) 100 MG TAB    Take 150 mg by mouth every day.    CLONAZEPAM (KLONOPIN) 1 MG TAB    Take 1.5 mg by mouth 2 times a day as needed.    EZETIMIBE (ZETIA) 10 MG TAB    Take 10 mg by mouth every day.    GABAPENTIN (NEURONTIN) 100 MG CAP    Take 100 mg by mouth in the morning, at noon, and at bedtime.    HYDROXYZINE HCL (ATARAX) 10 MG TAB    Take 10 mg by mouth 2 times a day as needed.    LEVOTHYROXINE (SYNTHROID) 75 MCG TAB    Take 75 mcg by mouth every morning on an empty stomach.    METHOCARBAMOL (ROBAXIN) 500 MG TAB    Take 500 mg by mouth every day.    OMEPRAZOLE (PRILOSEC) 20 MG DELAYED-RELEASE CAPSULE    Take 20 mg by mouth every day.    ROSUVASTATIN (CRESTOR) 5 MG TAB    Take 5 mg by mouth every day.    TRAZODONE (DESYREL) 50 MG TAB    Take 50 mg by mouth at bedtime as needed.    TRINTELLIX 20 MG TAB    Take 20 mg by mouth every day.    ZOLPIDEM (AMBIEN) 10 MG TAB    Take 10 mg by mouth every day.          A:  Patient denies taking any prescription medications.  He was prescribed Suboxone on 4/14/23; patient reports taking for ~2 days. He then stopped because he didn't feel that they were helping his back pain.  No other prescription medications.   Patient is awaiting transfer to in psych facility pending acceptance.       P:    Recommended adding something for pain given that patient had recently been prescribed Suboxone for back pain and has required a one-time dose of Toradol since arriving in the ED. DW Dr. Mcfarland, plan to initiate Mobic.   No additional recommendations at this time.     Nakul Lima, PharmD      =

## 2023-04-25 NOTE — ED NOTES
Checked on bed, with unlabored respirations. No safety risk noted  Awake on bed  Sitter - 1:1; Continued safety precaution  Gurney in low position, side rail up for pt safety.   Will continue to monitor for any complications.
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
## 2023-04-25 NOTE — ED NOTES
Report given to the next shift RN Jey for continuity of care and management.  Provided opportunity to asks questions.  1: 1 sitter  All pt belongings at locker room  Pending: awaiting for placement

## 2023-04-25 NOTE — ED NOTES
Checked on bed, with unlabored respirations. No safety risk noted  Awake on bed watching TV  Sitter - 1:1; Continued safety precaution  Gurney in low position, side rail up for pt safety.   Will continue to monitor for any complications.

## 2023-04-25 NOTE — ED NOTES
Checked on bed, with unlabored respirations. No safety risk noted  Sleeping  Sitter - 1:1; Continued safety precaution  Gurney in low position, side rail up for pt safety.   Will continue to monitor for any complications.

## 2023-04-25 NOTE — ED NOTES
Notified to the provider that pt is asking for medication that will help him sleep, since according to him he never sleep for 48 hrs already  Awaiting for response

## 2023-04-25 NOTE — DISCHARGE PLANNING
Pt has a cat at home and is wanting to contact his neighbor to feed his cat. SW assisted Pt in contacting neighbor so that he will be able to feed his cat while he is at the hospital. Neighbor not home and a VM was left.   SW will continue to assist Pt in contacting neighbor to assist with feeding his cat.

## 2023-04-25 NOTE — ED NOTES
Pt still awake on bed and restless, he is asking for medication to sleep  Notified to the provider, awaiting for order

## 2023-04-25 NOTE — DISCHARGE PLANNING
Spoke to ANA Cruz. She will assist in facilitating a single case agreement to present to [facility] for consideration.

## 2023-04-25 NOTE — DISCHARGE PLANNING
Pt Friend Wilbur Andrade [phone] returned Pt call. Wilbur will come and  Pt apartment key and will take care of Pt cat while he is in hospital. SW able to get key out of Pt locker. Pt assisted SW in taking key off his key ring and in agreement to give apartment key to friend. SW returned Pt. Key ring to his Locker #4.     SW will remain available for any continued needs.

## 2023-04-25 NOTE — DISCHARGE PLANNING
Phyllis Cannon, [facility] approved a single case agreement for [facility]: [id number]  Notified Radha at [facility], packet is under review. No bed availability at this time.

## 2023-04-25 NOTE — ED NOTES
Checked on bed, with unlabored respirations. No safety risk noted  Awake on bed, walking around the room  Sitter - 1:1; Continued safety precaution  Gurney in low position, side rail up for pt safety.   Will continue to monitor for any complications.

## 2023-04-25 NOTE — CONSULTS
[facility]   TRIAGE ASSESSMENT    Name: Juan Lubin  MRN: [id number]  : 1950  Age: 73 y.o.  Date of assessment: 2023  PCP: [facility]  Persons in attendance: Patient  Patient Location: [facility]    CHIEF COMPLAINT/PRESENTING ISSUE (as stated by patient): Pt called EMS with reports of suicidal ideation with a gun in his home. Plan to go into the woods and shoot himself. Stressors include an argument with his son, chronic pain and being scammed out of $30,000 six months ago. Unwilling to secure weapon. Lives alone, is alert, oriented and able to attend to ADLs without assistance. Not engaged in outpatient mental health treatment. High risk due to weapon in home. Legal hold initiated, referrals sent to area facilities  Chief Complaint   Patient presents with    Suicidal Ideation     Plan today, no attempt, gun at home, [facility] called 911, recent insomnia, not on current meds        CURRENT LIVING SITUATION/SOCIAL SUPPORT/FINANCIAL RESOURCES: Lives alone. Minimal social support    BEHAVIORAL HEALTH/SUBSTANCE USE TREATMENT HISTORY  Does patient/parent report a history of prior behavioral health/substance use treatment for patient?   No:    SAFETY ASSESSMENT - SELF  Does patient acknowledge current or past symptoms of dangerousness to self or is previous history noted? yes  Does parent/significant other report patient has current or past symptoms of dangerousness to self? N\A  Does presenting problem suggest symptoms of dangerousness to self? Yes:     Past Current    Suicidal Thoughts: []  [x]    Suicidal Plans: []  [x]    Suicidal Intent: []  [x]    Suicide Attempts: []  []    Self-Injury []  []      For any boxes checked above, provide detail: SI with plan to shoot himself.    History of suicide by family member: no  History of suicide by friend/significant other: no  Recent change in frequency/specificity/intensity of suicidal thoughts or  "self-harm behavior? yes  Current access to firearms, medications, or other identified means of suicide/self-harm? yes gun at home  If yes, willing to restrict access to means of suicide/self-harm? Yes, while in the ED  Protective factors present:  Fear of suicide and Willing to address in treatment    SAFETY ASSESSMENT - OTHERS  Does patient acknowledge current or past symptoms of aggressive behavior or risk to others or is previous history noted? no  Does parent/significant other report patient has current or past symptoms of aggressive behavior or risk to others?  N\A  Does presenting problem suggest symptoms of dangerousness to others? No    LEGAL HISTORY  Does patient acknowledge history of arrest/MCFP/detention or is previous history noted? no    Crisis Safety Plan completed and copy given to patient? N\A    ABUSE/NEGLECT SCREENING  Does patient report feeling “unsafe” in his/her home, or afraid of anyone?  no  Does patient report any history of physical, sexual, or emotional abuse?  no  Does parent or significant other report any of the above? N\A  Is there evidence of neglect by self? Yes, noncompliance with medical treatment  Is there evidence of neglect by a caregiver? no  Does the patient/parent report any history of CPS/APS/police involvement related to suspected abuse/neglect or domestic violence? no  Based on the information provided during the current assessment, is a mandated report of suspected abuse/neglect being made?  No    SUBSTANCE USE SCREENING  Yes:  Nba all substances used in the past 30 days: Denies substance use      Last Use Amount   []   Alcohol     []   Marijuana     []   Heroin     []   Prescription Opioids  (used without prescription, for    recreation, or in excess of prescribed amount)     []   Other Prescription  (used without prescription, for    recreation, or in excess of prescribed amount)     []   Cocaine      []   Methamphetamine     []   \"\" drugs (ectasy, MDMA)     []   " Other substances        UDS results: negative for all substances  Breathalyzer results: 0.0      MENTAL STATUS   Participation: Active verbal participation and Limited verbal participation  Grooming: Casual  Orientation: Alert and Fully Oriented  Behavior: Calm  Eye contact: Good  Mood: Depressed, irritable  Affect: Congruent with content  Thought process: Logical and Goal-directed  Thought content: Within normal limits  Speech: Rate within normal limits and Volume within normal limits  Perception: Within normal limits  Memory:  No gross evidence of memory deficits  Insight: Limited  Judgment:  Adequate  Other:    Collateral information:    Source:  [] Significant other present in person:   [] Significant other by telephone  [] [facility]   [] [facility] Nursing Staff  [] [facility] Medical Record  [] Other:     [] Unable to complete full assessment due to:  [] Acute intoxication  [] Patient declined to participate/engage  [] Patient verbally unresponsive  [] Significant cognitive deficits  [] Significant perceptual distortions or behavioral disorganization  [] Other:      CLINICAL IMPRESSIONS:  Primary:  suicidal ideation  Secondary:  noncompliance with medical care       IDENTIFIED NEEDS/PLAN:  [Trigger DISPOSITION list for any items marked]    [x]  Imminent safety risk - self [] Imminent safety risk - others   []  Acute substance withdrawal []  Psychosis/Impaired reality testing   []  Mood/anxiety []  Substance use/Addictive behavior   []  Maladaptive behaviro []  Parent/child conflict   []  Family/Couples conflict []  Biomedical   []  Housing []  Financial   []   Legal  Occupational/Educational   []  Domestic violence []  Other:     Recommended Plan of Care:  Actively being addressed by [facility] and [facility] Emergency Department, Refer to [facility] and [facility], and 1:1 Observation  *Telesitter may not be utilized for moderate or high risk  patients    Has the Recommended Plan of Care/Level of Observation been reviewed with the patient's assigned nurse? yes    Does patient/parent or guardian express agreement with the above plan? yes     Referral appointment(s) scheduled? N\A    Alert team only:   I have discussed findings and recommendations with Dr. Corona who is in agreement with these recommendations.     Referral information sent to the following outpatient community providers :    Referral information sent to the following inpatient community providers : [facility], [facility], [facility], [facility]    If applicable : Referred  to  Alert Team for legal hold follow up at (time): 4/27/23      Myrna Wilson R.N.  4/24/2023

## 2023-04-25 NOTE — DISCHARGE PLANNING
Alert Team Note:    Contacted [facility], spoke to Kendra. Pt referral has been received and is being reviewed.

## 2023-04-26 PROCEDURE — 700102 HCHG RX REV CODE 250 W/ 637 OVERRIDE(OP): Performed by: EMERGENCY MEDICINE

## 2023-04-26 PROCEDURE — 99284 EMERGENCY DEPT VISIT MOD MDM: CPT | Performed by: PSYCHIATRY & NEUROLOGY

## 2023-04-26 PROCEDURE — A9270 NON-COVERED ITEM OR SERVICE: HCPCS | Performed by: EMERGENCY MEDICINE

## 2023-04-26 PROCEDURE — 700102 HCHG RX REV CODE 250 W/ 637 OVERRIDE(OP): Performed by: PSYCHIATRY & NEUROLOGY

## 2023-04-26 PROCEDURE — A9270 NON-COVERED ITEM OR SERVICE: HCPCS | Performed by: PSYCHIATRY & NEUROLOGY

## 2023-04-26 RX ORDER — DULOXETIN HYDROCHLORIDE 20 MG/1
20 CAPSULE, DELAYED RELEASE ORAL DAILY
Status: DISCONTINUED | OUTPATIENT
Start: 2023-04-26 | End: 2023-04-27 | Stop reason: HOSPADM

## 2023-04-26 RX ORDER — CALCIUM CARBONATE 500 MG/1
1000 TABLET, CHEWABLE ORAL 2 TIMES DAILY PRN
Status: DISCONTINUED | OUTPATIENT
Start: 2023-04-26 | End: 2023-04-27 | Stop reason: HOSPADM

## 2023-04-26 RX ORDER — OMEPRAZOLE 20 MG/1
20 CAPSULE, DELAYED RELEASE ORAL DAILY
Status: DISCONTINUED | OUTPATIENT
Start: 2023-04-26 | End: 2023-04-27 | Stop reason: HOSPADM

## 2023-04-26 RX ADMIN — DULOXETINE HYDROCHLORIDE 20 MG: 20 CAPSULE, DELAYED RELEASE ORAL at 16:53

## 2023-04-26 RX ADMIN — OMEPRAZOLE 20 MG: 20 CAPSULE, DELAYED RELEASE ORAL at 16:03

## 2023-04-26 RX ADMIN — MELOXICAM 7.5 MG: 7.5 TABLET ORAL at 10:03

## 2023-04-26 ASSESSMENT — ENCOUNTER SYMPTOMS
MUSCULOSKELETAL NEGATIVE: 1
PSYCHIATRIC NEGATIVE: 1
NEUROLOGICAL NEGATIVE: 1
GASTROINTESTINAL NEGATIVE: 1
CARDIOVASCULAR NEGATIVE: 1
SORE THROAT: 0

## 2023-04-26 NOTE — DISCHARGE PLANNING
Alert Team Note:    Attempted to contact [facility] to inquire about referral status. Writer left a message.

## 2023-04-26 NOTE — ED NOTES
Report given to 1:1 sitter. Sitter in direct view of pt. Pt resting on gurney w/ eyes closed. NAD noted.

## 2023-04-26 NOTE — CONSULTS
PSYCHIATRIC FOLLOW-UP:(established)  *Reason for admission:   SI     *Legal Hold Status:     on hold        Chart reviewed.         *HPI:    Pt denies any active or passive SI/ He says that speaking with staff has improved his mood and changed his mind about his SI. Pt says he wants to live to make new friends and explore the world. Specifically he wants to go back to hiking once he is discharged. He denies feeling depressed or anxious. He says that he slept well last night and that he has a good sleep.  Pt denies any arnoldo or psychotic symptoms. Due to abnoral EKG, I discussed with patient about dc Elavil and start duloxetine instead, considering he has a hx of back pain. He agreed.       Medical ROS (as pertinent):     Review of Systems   Constitutional:  Negative for malaise/fatigue.   HENT:  Negative for sore throat.    Cardiovascular: Negative.    Gastrointestinal: Negative.    Genitourinary: Negative.    Musculoskeletal: Negative.    Skin:  Negative for rash.   Neurological: Negative.    Psychiatric/Behavioral: Negative.           I spoke with pt's friend Wilbur [phone]. He informed that he removed all the guns (2) from patient's home. He will be available to come and see patient tomorrow at 1;30-2pm and have a meeting to discuss a safe discharge plan. Wilbur leaves 20 minutes away from patient.     *Psychiatric Examination:  Vitals:   Vitals:    04/26/23 1103   BP: (!) 141/76   Pulse: 70   Resp: 16   Temp:    SpO2: 95%   Appearance: appears stated age, fair grooming and hygiene, calm, cooperative, good eye contact  Abnormal movements: none  Gait/posture: normal  Speech: normal volume, tone and rhythm  Though process: linear and goal oriented  Associations: no loose associations  Thought content: denies AVH, no delusions or paranoia elicited, does not appear to be responding to internal stimuli, neither is internally preoccupied.   Judgement and Insight: good/good  Orientation:oriented to person, place,  "time and situation  Recent and Remote Memory: intact  Attention Span and Concentration: intact  Language: fluid   Fund of Knowledge: not tested   Mood and Affect:\"good\" euthymic, appropriate   SI/HI:denies any active or passive SI/HI     *PAST MEDICAL/PSYCH/FAMILY/SOCIAL(as reported by patient):       Unchanged       *EKG:   SR, RBB and LAFB  *Imaging:   No orders to display        EEG:  not applicable      *Labs personally reviewed:   Recent Results (from the past 24 hour(s))   EKG    Collection Time: 23  4:34 PM   Result Value Ref Range    Report       [facility] Emergency Dept.    Test Date:  2023  Pt Name:    JOHAN KO                  Department: ER  MRN:        [id number]                      Room:       [facility]  Gender:     Male                         Technician: [id number]  :        1950                   Requested By:PEPPER BURCIAGA  Order #:    [id number]                    Reading MD:    Measurements  Intervals                                Axis  Rate:       72                           P:          66  NV:         201                          QRS:        -62  QRSD:       141                          T:          32  QT:         436  QTc:        478    Interpretive Statements  Sinus rhythm  RBBB and LAFB  No previous ECG available for comparison           Assessment: Pt continues to deny SI. He is future oriented and yesterday completed a safety plan. He currently denies  any depressed mood, anhedonia or acute anxiety. His affect is euthymic.  Pt has a hx of MDD, not on psychotropic medications prior to admission. I am switching him from elavil to duloxetine. I Spoke with his friend Wilbur who confirmed that guns have been removed from the home. He will also plan to come tomorrow to participate on discussion and planning of a safe discharge.       Dx:  MDD, recurrent, improved  Adjustment disorder with anxious features           Plan:  1- Legal " hold:extended  2- Psychotropic medications: discontinue elavil due to abnormal EKG  Start duloxetine 20mg PO daily for anxiety and depression  3- Please transfer pt to inpatient psychiatric hospital when medically cleared and bed is available  4- If patient is still here tomorrow, I will request Dr Elizabeth to see patient for a psychotherapy session considering his most recent stressors.   5- Planning for a meeting with pt's friend Wilbur tomorrow afternoon.   6- Psychiatry will follow up    Thank you for the consult.       Sitter:yes  Phone:yes  Visitors:yes  Personal belongings: yes    This note was created using voice recognition software (Dragon). The accuracy of the dictation is limited by the abilities of the software. I have reviewed the note prior to signing. However, error related to voice recognition software and /or scribes may still exist and should be interpreted within the appropriate context.

## 2023-04-26 NOTE — ED NOTES
Bedside report from SHARIFA FITZGERALD. Patient is A+Ox4. Denies SI/HI at this time. Sitter outside room.

## 2023-04-26 NOTE — ED NOTES
Wilbur is the friend of patient who could take the guns out of the house. Phone number is [phone]

## 2023-04-26 NOTE — DISCHARGE PLANNING
Faxed psychiatry consult to [facility]. Packet is under review.  Faxed psychiatry consult to [facility] [place].

## 2023-04-26 NOTE — ED NOTES
Pt resting on gurney, NAD noted, respirations even and unlabored. 1:1 sitter in direct observation of pt.   Pt complaining of low back pain and requesting medication. ERP notified.

## 2023-04-26 NOTE — ED NOTES
Pt updated on POC. Verbalized understanding. No other needs at this time. Sitter in direct view of pt.

## 2023-04-26 NOTE — DISCHARGE PLANNING
HTH/SCP  JERALDN Phyllis chart review completed.   Patient seen at bedside, 1:1 Sitter; Introduced my role as TCN.  Collaborated with Alert Team Myrna SKAGGS.  TCN will defer to Hospital Alert Team.  Pt awaiting placement ( [facility] vs. [facility]).Alert Team to facilitate

## 2023-04-26 NOTE — PROGRESS NOTES
"ED Observation Progress Note    Date of Service: 04/26/23    Interval History and Interventions  This is a 73-year-old who is awaiting inpatient psychiatric placement for depression and SI.  Patient had no acute complaints this morning.  He states he did not sleep very well but had nice conversations with the sitter and staff.    Physical Exam  BP (!) 140/69   Pulse 78   Temp 36.6 °C (97.8 °F) (Temporal)   Resp 16   Ht 1.803 m (5' 11\")   Wt 86.2 kg (190 lb)   SpO2 98%   BMI 26.50 kg/m² .    Constitutional: Awake and alert. Nontoxic  HENT:  Grossly normal  Eyes: Grossly normal  Neck: Normal range of motion  Cardiovascular: Normal heart rate   Thorax & Lungs: No respiratory distress  Abdomen: Nontender  Skin:  No pathologic rash.   Extremities: Well perfused  Psychiatric: Affect normal    Labs  Results for orders placed or performed during the hospital encounter of 04/24/23   Urine Drug Screen   Result Value Ref Range    Amphetamines Urine Negative Negative    Barbiturates Negative Negative    Benzodiazepines Negative Negative    Cocaine Metabolite Negative Negative    Methadone Negative Negative    Opiates Negative Negative    Oxycodone Negative Negative    Phencyclidine -Pcp Negative Negative    Propoxyphene Negative Negative    Cannabinoid Metab Negative Negative   CoV-2, FLU A/B, and RSV by PCR (2-4 Hours CEPHEID) : Collect NP swab in VTM    Specimen: Respirate   Result Value Ref Range    Influenza virus A RNA Negative Negative    Influenza virus B, PCR Negative Negative    RSV, PCR Negative Negative    SARS-CoV-2 by PCR NotDetected     SARS-CoV-2 Source NP Swab    TSH   Result Value Ref Range    TSH 2.330 0.380 - 5.330 uIU/mL   VITAMIN B12   Result Value Ref Range    Vitamin B12 -True Cobalamin 872 211 - 911 pg/mL   POC BREATHALIZER   Result Value Ref Range    POC Breathalizer 0.000 0.00 - 0.01 Percent   POC Breathalizer   Result Value Ref Range    POC Breathalizer 0.000 0.00 - 0.01 Percent   EKG   Result " Value Ref Range    Report       [facility] Emergency Dept.    Test Date:  2023  Pt Name:    JOHAN KO                  Department: ER  MRN:        [id number]                      Room:        31  Gender:     Male                         Technician: [id number]  :        1950                   Requested By:PEPPER BURCIAGA  Order #:    [id number]                    Reading MD:    Measurements  Intervals                                Axis  Rate:       72                           P:          66  CO:         201                          QRS:        -62  QRSD:       141                          T:          32  QT:         436  QTc:        478    Interpretive Statements  Sinus rhythm  RBBB and LAFB  No previous ECG available for comparison         Radiology  No orders to display       Problem List  1. Suicidal ideation        2. Chronic midline low back pain, unspecified whether sciatica present              Electronically signed by: Mana Gordillo M.D., 2023 8:35 AM

## 2023-04-27 VITALS
HEIGHT: 71 IN | TEMPERATURE: 97 F | DIASTOLIC BLOOD PRESSURE: 85 MMHG | BODY MASS INDEX: 26.6 KG/M2 | OXYGEN SATURATION: 96 % | SYSTOLIC BLOOD PRESSURE: 142 MMHG | WEIGHT: 190 LBS | HEART RATE: 93 BPM | RESPIRATION RATE: 14 BRPM

## 2023-04-27 LAB — EKG IMPRESSION: NORMAL

## 2023-04-27 PROCEDURE — 700102 HCHG RX REV CODE 250 W/ 637 OVERRIDE(OP): Performed by: EMERGENCY MEDICINE

## 2023-04-27 PROCEDURE — A9270 NON-COVERED ITEM OR SERVICE: HCPCS | Performed by: PSYCHIATRY & NEUROLOGY

## 2023-04-27 PROCEDURE — A9270 NON-COVERED ITEM OR SERVICE: HCPCS | Performed by: EMERGENCY MEDICINE

## 2023-04-27 PROCEDURE — 700102 HCHG RX REV CODE 250 W/ 637 OVERRIDE(OP): Performed by: PSYCHIATRY & NEUROLOGY

## 2023-04-27 RX ADMIN — MELOXICAM 7.5 MG: 7.5 TABLET ORAL at 08:06

## 2023-04-27 RX ADMIN — DULOXETINE HYDROCHLORIDE 20 MG: 20 CAPSULE, DELAYED RELEASE ORAL at 07:54

## 2023-04-27 RX ADMIN — OMEPRAZOLE 20 MG: 20 CAPSULE, DELAYED RELEASE ORAL at 07:54

## 2023-04-27 NOTE — ED NOTES
Report to [facility] EMTs. Patient's sealed bag of cash of $173 was shown to [facility] and placed in transfer envelope still sealed. Belongings were taken from locker 4 and given to [facility] as well. Patient ambulatory with a steady gait out to ambulance for transfer to [facility].

## 2023-04-27 NOTE — DISCHARGE SUMMARY
"  ED Observation Discharge Summary    Patient:Juan Lubin  Patient : 1950  Patient MRN: [id number]  Patient PCP: [facility]    Admit Date: 2023  Discharge Date and Time: 23 1:48 PM  Discharge Diagnosis:   1. Suicidal ideation        2. Chronic midline low back pain, unspecified whether sciatica present        Discharge Attending: Bean Rosado M.D.  Discharge Service: ED Observation    ED Course  Juan is a 73 y.o. male who was initially evaluated at [facility] 2023 for evaluation of suicidal ideation that has been ongoing for several months.  He did not have a plan to kill himself but did admit to having 3 guns at home.  His geriatrics nurse called 911 who brought him to the ER.  He was placed on a legal hold and medically cleared.  He was kept under observation in the emergency department until inpatient psychiatric care could be secured and ultimately discharged to [facility].    Discharge Exam:  BP (!) 142/85   Pulse 93   Temp 36.1 °C (97 °F) (Temporal)   Resp 14   Ht 1.803 m (5' 11\")   Wt 86.2 kg (190 lb)   SpO2 96%   BMI 26.50 kg/m² .    Constitutional: Awake and alert. Nontoxic  HENT:  Grossly normal  Eyes: Grossly normal  Neck: Normal range of motion  Cardiovascular: Normal heart rate   Thorax & Lungs: No respiratory distress  Abdomen: Nontender  Skin:  No pathologic rash.   Extremities: Well perfused  Psychiatric: Affect normal    Labs  Results for orders placed or performed during the hospital encounter of 23   Urine Drug Screen   Result Value Ref Range    Amphetamines Urine Negative Negative    Barbiturates Negative Negative    Benzodiazepines Negative Negative    Cocaine Metabolite Negative Negative    Methadone Negative Negative    Opiates Negative Negative    Oxycodone Negative Negative    Phencyclidine -Pcp Negative Negative    Propoxyphene Negative Negative    Cannabinoid Metab Negative Negative   CoV-2, FLU A/B, and RSV " by PCR (2-4 Hours CEPHEID) : Collect NP swab in VTM    Specimen: Respirate   Result Value Ref Range    Influenza virus A RNA Negative Negative    Influenza virus B, PCR Negative Negative    RSV, PCR Negative Negative    SARS-CoV-2 by PCR NotDetected     SARS-CoV-2 Source NP Swab    TSH   Result Value Ref Range    TSH 2.330 0.380 - 5.330 uIU/mL   VITAMIN B12   Result Value Ref Range    Vitamin B12 -True Cobalamin 872 211 - 911 pg/mL   POC BREATHALIZER   Result Value Ref Range    POC Breathalizer 0.000 0.00 - 0.01 Percent   POC Breathalizer   Result Value Ref Range    POC Breathalizer 0.000 0.00 - 0.01 Percent   EKG   Result Value Ref Range    Report       [facility] Emergency Dept.    Test Date:  2023  Pt Name:    JOHAN KO                  Department: ER  MRN:        [id number]                      Room:       [facility]  Gender:     Male                         Technician: [id number]  :        1950                   Requested By:PEPPER BURCIAGA  Order #:    [id number]                    Reading MD: Bean Rosado MD    Measurements  Intervals                                Axis  Rate:       72                           P:          66  TN:         201                          QRS:        -62  QRSD:       141                          T:          32  QT:         436  QTc:        478    Interpretive Statements  Sinus rhythm  RBBB and LAFB  No previous ECG available for comparison  Electronically Signed On 2023 13:51:06 PDT by Bean Rosado MD         Radiology  No orders to display       Medications:   Discharge Medication List as of 2023 11:45 AM          My final assessment includes: Multiple bedside reevaluations, discussion with bedside nurse, discussion with behavioral health team, and preparation of the discharge summary    Upon Reevaluation, the patient's condition has: not improved; and will be escalated to hospitalization.    Patient discharged from ED Observation status at  11:45 AM (Time) 4/27/2023 (Date).     Total time spent on this ED Observation discharge encounter is > 30 Minutes    Electronically signed by: Bean Rosado M.D., 4/27/2023 1:48 PM

## 2023-04-27 NOTE — ED NOTES
Patient assisted with morning care provided with tooth paste and comb. Bed linen changed , changed gown, all items removed after use.  1:1 sitter in direct view of patient. Patient actively participated in care.

## 2023-04-27 NOTE — ED NOTES
Report from SHARIFA Colon, patient resting on stretcher in NAD. Sitter in doorway for 1:1 observation, will continue to monitor

## 2023-04-27 NOTE — ED NOTES
Patient medicated per MAR. Patient resting on stretcher in no acute distress. Equal chest rise and fall noted. Sitter in doorway for direct 1:1 observation. Room safety checklist in use. No needs at this time, will continue to monitor

## 2023-04-27 NOTE — DISCHARGE PLANNING
Alert Team Note:    Attempted to contact [facility] to inquire about referral status. Writer left a message for Yuko requesting a call back.

## 2023-04-27 NOTE — ED NOTES
Checked on bed, pt resting in gurney with unlabored respirations. Gurney in low position, side rail up for pt safety. Patient is in direct view of 1:1 sitter, will continue to observe.

## 2023-04-27 NOTE — DISCHARGE PLANNING
Legal Hold Transfer     Referral: Legal hold transfer to Mental Health Facility     Intervention: Notified by  Bonnie that pt has been accepted to [facility].     Pt's accepting physcian is Dr. Alva    [facility] + Auth# [id number]     Transport arranged through Latrice at [facility]     The pt will be picked up at 1130      Notified Bedside RN Ambika, Alert Team SHARIFA Ag, and Dr. Rosado of the departure time as well as accepting facility.      Transfer packet and COBRA created with original legal hold and placed on chart.      Plan: Pt will be transferring to [facility] today at 1130 via [facility].

## 2023-04-27 NOTE — ED NOTES
Patient bed side report given to RN Cathyln, patient resting in gurney with even and unlabored respirations, SI precautions in place, Patient is in direct view of 1:1 sitter. Awaiting placement.

## 2023-04-27 NOTE — DISCHARGE PLANNING
Alert Team Note:    Faxed updated legal hold extension to [facility] as requested by Francisco.

## 2023-04-27 NOTE — ED NOTES
Patient alert and awake, in direct view of 1:1 sitter. Safety precaution in place. Will continue to observe.

## 2023-04-27 NOTE — ED NOTES
Bedside report received from RN Carrie, pt AAO X4 , respirations even and unlabored , in direct view of 1:1 sitter, on Legal hold, safety precautions in place.

## 2023-04-27 NOTE — ED NOTES
Patient bed repositioned, patient educated on patient safety interventions, sitter given report, 1:1 sitter in direct view of patient.

## 2023-04-27 NOTE — ED NOTES
Patient resting on stretcher in NAD. Sitter in doorway for direct observation. Will continue to monitor

## 2023-04-27 NOTE — DISCHARGE PLANNING
Alert Team Note:    Contacted by [facility], spoke to Lisbeth. Pt has been accepted, accepting is Dr. Alva. Facility expects transport at 1130.  Informed LH MAJO Infante.

## 2023-05-03 ENCOUNTER — HOSPITAL ENCOUNTER (OUTPATIENT)
Dept: LAB | Facility: MEDICAL CENTER | Age: 73
End: 2023-05-03
Payer: MEDICARE

## 2023-05-03 DIAGNOSIS — R30.0 DYSURIA: ICD-10-CM

## 2023-05-03 PROBLEM — F13.20 SEDATIVE, HYPNOTIC OR ANXIOLYTIC DEPENDENCE (HCC): Chronic | Status: ACTIVE | Noted: 2021-11-16

## 2023-05-03 PROBLEM — F33.41 RECURRENT MAJOR DEPRESSIVE DISORDER, IN PARTIAL REMISSION (HCC): Chronic | Status: ACTIVE | Noted: 2021-11-16

## 2023-05-03 PROBLEM — F11.20 UNCOMPLICATED OPIOID DEPENDENCE (HCC): Chronic | Status: ACTIVE | Noted: 2021-11-16

## 2023-05-03 PROBLEM — F33.1 MODERATE EPISODE OF RECURRENT MAJOR DEPRESSIVE DISORDER (HCC): Status: ACTIVE | Noted: 2021-11-16

## 2023-05-03 LAB
APPEARANCE UR: CLEAR
BILIRUB UR QL STRIP.AUTO: NEGATIVE
COLOR UR: YELLOW
GLUCOSE UR STRIP.AUTO-MCNC: NEGATIVE MG/DL
KETONES UR STRIP.AUTO-MCNC: NEGATIVE MG/DL
LEUKOCYTE ESTERASE UR QL STRIP.AUTO: NEGATIVE
MICRO URNS: NORMAL
NITRITE UR QL STRIP.AUTO: NEGATIVE
PH UR STRIP.AUTO: 6 [PH] (ref 5–8)
PROT UR QL STRIP: NEGATIVE MG/DL
RBC UR QL AUTO: NEGATIVE
SP GR UR STRIP.AUTO: 1.02
UROBILINOGEN UR STRIP.AUTO-MCNC: 0.2 MG/DL

## 2023-05-03 PROCEDURE — 81003 URINALYSIS AUTO W/O SCOPE: CPT

## 2023-05-06 PROBLEM — R39.9 LOWER URINARY TRACT SYMPTOMS (LUTS): Status: ACTIVE | Noted: 2023-05-06

## 2023-05-06 PROBLEM — G47.09 OTHER INSOMNIA: Chronic | Status: ACTIVE | Noted: 2021-11-16

## 2023-05-19 ENCOUNTER — HOSPITAL ENCOUNTER (OUTPATIENT)
Facility: MEDICAL CENTER | Age: 73
End: 2023-05-20
Attending: EMERGENCY MEDICINE | Admitting: HOSPITALIST
Payer: MEDICARE

## 2023-05-19 DIAGNOSIS — G25.79 SEROTONIN SYNDROME: ICD-10-CM

## 2023-05-19 LAB
ALBUMIN SERPL BCP-MCNC: 4.5 G/DL (ref 3.2–4.9)
ALBUMIN/GLOB SERPL: 1.8 G/DL
ALP SERPL-CCNC: 81 U/L (ref 30–99)
ALT SERPL-CCNC: 35 U/L (ref 2–50)
ANION GAP SERPL CALC-SCNC: 14 MMOL/L (ref 7–16)
APPEARANCE UR: CLEAR
AST SERPL-CCNC: 24 U/L (ref 12–45)
BASOPHILS # BLD AUTO: 0.8 % (ref 0–1.8)
BASOPHILS # BLD: 0.06 K/UL (ref 0–0.12)
BILIRUB SERPL-MCNC: 0.8 MG/DL (ref 0.1–1.5)
BILIRUB UR QL STRIP.AUTO: NEGATIVE
BUN SERPL-MCNC: 16 MG/DL (ref 8–22)
CALCIUM ALBUM COR SERPL-MCNC: 9 MG/DL (ref 8.5–10.5)
CALCIUM SERPL-MCNC: 9.4 MG/DL (ref 8.5–10.5)
CHLORIDE SERPL-SCNC: 103 MMOL/L (ref 96–112)
CK SERPL-CCNC: 71 U/L (ref 0–154)
CO2 SERPL-SCNC: 21 MMOL/L (ref 20–33)
COLOR UR: YELLOW
CREAT SERPL-MCNC: 0.97 MG/DL (ref 0.5–1.4)
EKG IMPRESSION: NORMAL
EOSINOPHIL # BLD AUTO: 0.04 K/UL (ref 0–0.51)
EOSINOPHIL NFR BLD: 0.5 % (ref 0–6.9)
ERYTHROCYTE [DISTWIDTH] IN BLOOD BY AUTOMATED COUNT: 40 FL (ref 35.9–50)
ETHANOL BLD-MCNC: <10.1 MG/DL
GFR SERPLBLD CREATININE-BSD FMLA CKD-EPI: 83 ML/MIN/1.73 M 2
GLOBULIN SER CALC-MCNC: 2.5 G/DL (ref 1.9–3.5)
GLUCOSE SERPL-MCNC: 166 MG/DL (ref 65–99)
GLUCOSE UR STRIP.AUTO-MCNC: NEGATIVE MG/DL
HCT VFR BLD AUTO: 45.5 % (ref 42–52)
HGB BLD-MCNC: 16 G/DL (ref 14–18)
IMM GRANULOCYTES # BLD AUTO: 0.03 K/UL (ref 0–0.11)
IMM GRANULOCYTES NFR BLD AUTO: 0.4 % (ref 0–0.9)
KETONES UR STRIP.AUTO-MCNC: ABNORMAL MG/DL
LEUKOCYTE ESTERASE UR QL STRIP.AUTO: NEGATIVE
LYMPHOCYTES # BLD AUTO: 1.94 K/UL (ref 1–4.8)
LYMPHOCYTES NFR BLD: 26.6 % (ref 22–41)
MCH RBC QN AUTO: 29.1 PG (ref 27–33)
MCHC RBC AUTO-ENTMCNC: 35.2 G/DL (ref 33.7–35.3)
MCV RBC AUTO: 82.7 FL (ref 81.4–97.8)
MICRO URNS: ABNORMAL
MONOCYTES # BLD AUTO: 0.59 K/UL (ref 0–0.85)
MONOCYTES NFR BLD AUTO: 8.1 % (ref 0–13.4)
NEUTROPHILS # BLD AUTO: 4.64 K/UL (ref 1.82–7.42)
NEUTROPHILS NFR BLD: 63.6 % (ref 44–72)
NITRITE UR QL STRIP.AUTO: NEGATIVE
NRBC # BLD AUTO: 0 K/UL
NRBC BLD-RTO: 0 /100 WBC
PH UR STRIP.AUTO: 5.5 [PH] (ref 5–8)
PLATELET # BLD AUTO: 231 K/UL (ref 164–446)
PMV BLD AUTO: 10.6 FL (ref 9–12.9)
POTASSIUM SERPL-SCNC: 4 MMOL/L (ref 3.6–5.5)
PROT SERPL-MCNC: 7 G/DL (ref 6–8.2)
PROT UR QL STRIP: NEGATIVE MG/DL
RBC # BLD AUTO: 5.5 M/UL (ref 4.7–6.1)
RBC UR QL AUTO: NEGATIVE
SODIUM SERPL-SCNC: 138 MMOL/L (ref 135–145)
SP GR UR STRIP.AUTO: 1.02
UROBILINOGEN UR STRIP.AUTO-MCNC: 1 MG/DL
WBC # BLD AUTO: 7.3 K/UL (ref 4.8–10.8)

## 2023-05-19 PROCEDURE — G0378 HOSPITAL OBSERVATION PER HR: HCPCS

## 2023-05-19 PROCEDURE — A9270 NON-COVERED ITEM OR SERVICE: HCPCS

## 2023-05-19 PROCEDURE — 96374 THER/PROPH/DIAG INJ IV PUSH: CPT

## 2023-05-19 PROCEDURE — 700105 HCHG RX REV CODE 258: Performed by: EMERGENCY MEDICINE

## 2023-05-19 PROCEDURE — 82550 ASSAY OF CK (CPK): CPT

## 2023-05-19 PROCEDURE — 700102 HCHG RX REV CODE 250 W/ 637 OVERRIDE(OP)

## 2023-05-19 PROCEDURE — 96376 TX/PRO/DX INJ SAME DRUG ADON: CPT

## 2023-05-19 PROCEDURE — 700111 HCHG RX REV CODE 636 W/ 250 OVERRIDE (IP)

## 2023-05-19 PROCEDURE — 81003 URINALYSIS AUTO W/O SCOPE: CPT

## 2023-05-19 PROCEDURE — 700111 HCHG RX REV CODE 636 W/ 250 OVERRIDE (IP): Performed by: EMERGENCY MEDICINE

## 2023-05-19 PROCEDURE — 93005 ELECTROCARDIOGRAM TRACING: CPT | Performed by: EMERGENCY MEDICINE

## 2023-05-19 PROCEDURE — 96372 THER/PROPH/DIAG INJ SC/IM: CPT

## 2023-05-19 PROCEDURE — 99285 EMERGENCY DEPT VISIT HI MDM: CPT

## 2023-05-19 PROCEDURE — 85025 COMPLETE CBC W/AUTO DIFF WBC: CPT

## 2023-05-19 PROCEDURE — 80053 COMPREHEN METABOLIC PANEL: CPT

## 2023-05-19 PROCEDURE — 82077 ASSAY SPEC XCP UR&BREATH IA: CPT

## 2023-05-19 PROCEDURE — 99223 1ST HOSP IP/OBS HIGH 75: CPT | Mod: FS | Performed by: HOSPITALIST

## 2023-05-19 PROCEDURE — 36415 COLL VENOUS BLD VENIPUNCTURE: CPT

## 2023-05-19 RX ORDER — QUETIAPINE FUMARATE 25 MG/1
25 TABLET, FILM COATED ORAL
Status: DISCONTINUED | OUTPATIENT
Start: 2023-05-19 | End: 2023-05-19

## 2023-05-19 RX ORDER — BISACODYL 10 MG
10 SUPPOSITORY, RECTAL RECTAL
Status: DISCONTINUED | OUTPATIENT
Start: 2023-05-19 | End: 2023-05-20 | Stop reason: HOSPADM

## 2023-05-19 RX ORDER — HYDROXYZINE 50 MG/1
50 TABLET, FILM COATED ORAL EVERY 8 HOURS PRN
Status: DISCONTINUED | OUTPATIENT
Start: 2023-05-19 | End: 2023-05-20 | Stop reason: HOSPADM

## 2023-05-19 RX ORDER — SODIUM CHLORIDE, SODIUM LACTATE, POTASSIUM CHLORIDE, CALCIUM CHLORIDE 600; 310; 30; 20 MG/100ML; MG/100ML; MG/100ML; MG/100ML
INJECTION, SOLUTION INTRAVENOUS CONTINUOUS
Status: DISCONTINUED | OUTPATIENT
Start: 2023-05-19 | End: 2023-05-20 | Stop reason: HOSPADM

## 2023-05-19 RX ORDER — QUETIAPINE FUMARATE 25 MG/1
25 TABLET, FILM COATED ORAL
Status: ON HOLD | COMMUNITY
End: 2023-05-20

## 2023-05-19 RX ORDER — POLYETHYLENE GLYCOL 3350 17 G/17G
1 POWDER, FOR SOLUTION ORAL
Status: DISCONTINUED | OUTPATIENT
Start: 2023-05-19 | End: 2023-05-20 | Stop reason: HOSPADM

## 2023-05-19 RX ORDER — OMEPRAZOLE 20 MG/1
20 CAPSULE, DELAYED RELEASE ORAL DAILY
Status: DISCONTINUED | OUTPATIENT
Start: 2023-05-20 | End: 2023-05-20 | Stop reason: HOSPADM

## 2023-05-19 RX ORDER — LORAZEPAM 2 MG/ML
1 INJECTION INTRAMUSCULAR EVERY 4 HOURS PRN
Status: DISCONTINUED | OUTPATIENT
Start: 2023-05-19 | End: 2023-05-20 | Stop reason: HOSPADM

## 2023-05-19 RX ORDER — HYDRALAZINE HYDROCHLORIDE 20 MG/ML
10 INJECTION INTRAMUSCULAR; INTRAVENOUS EVERY 4 HOURS PRN
Status: DISCONTINUED | OUTPATIENT
Start: 2023-05-19 | End: 2023-05-20 | Stop reason: HOSPADM

## 2023-05-19 RX ORDER — ZOLPIDEM TARTRATE 5 MG/1
5 TABLET ORAL NIGHTLY PRN
Status: DISCONTINUED | OUTPATIENT
Start: 2023-05-19 | End: 2023-05-20 | Stop reason: HOSPADM

## 2023-05-19 RX ORDER — LORAZEPAM 2 MG/ML
1 INJECTION INTRAMUSCULAR ONCE
Status: COMPLETED | OUTPATIENT
Start: 2023-05-19 | End: 2023-05-19

## 2023-05-19 RX ORDER — ENOXAPARIN SODIUM 100 MG/ML
30 INJECTION SUBCUTANEOUS EVERY 12 HOURS
Status: DISCONTINUED | OUTPATIENT
Start: 2023-05-19 | End: 2023-05-20 | Stop reason: HOSPADM

## 2023-05-19 RX ORDER — TAMSULOSIN HYDROCHLORIDE 0.4 MG/1
0.4 CAPSULE ORAL
Status: DISCONTINUED | OUTPATIENT
Start: 2023-05-20 | End: 2023-05-20 | Stop reason: HOSPADM

## 2023-05-19 RX ORDER — AMOXICILLIN 250 MG
2 CAPSULE ORAL 2 TIMES DAILY
Status: DISCONTINUED | OUTPATIENT
Start: 2023-05-19 | End: 2023-05-20 | Stop reason: HOSPADM

## 2023-05-19 RX ADMIN — ENOXAPARIN SODIUM 30 MG: 30 INJECTION SUBCUTANEOUS at 17:41

## 2023-05-19 RX ADMIN — LORAZEPAM 1 MG: 2 INJECTION INTRAMUSCULAR; INTRAVENOUS at 16:42

## 2023-05-19 RX ADMIN — LORAZEPAM 1 MG: 2 INJECTION INTRAMUSCULAR; INTRAVENOUS at 21:14

## 2023-05-19 RX ADMIN — ZOLPIDEM TARTRATE 5 MG: 5 TABLET ORAL at 21:16

## 2023-05-19 RX ADMIN — LORAZEPAM 1 MG: 2 INJECTION INTRAMUSCULAR; INTRAVENOUS at 15:04

## 2023-05-19 RX ADMIN — SODIUM CHLORIDE, POTASSIUM CHLORIDE, SODIUM LACTATE AND CALCIUM CHLORIDE: 600; 310; 30; 20 INJECTION, SOLUTION INTRAVENOUS at 19:14

## 2023-05-19 RX ADMIN — SODIUM CHLORIDE, POTASSIUM CHLORIDE, SODIUM LACTATE AND CALCIUM CHLORIDE: 600; 310; 30; 20 INJECTION, SOLUTION INTRAVENOUS at 15:04

## 2023-05-19 ASSESSMENT — PAIN DESCRIPTION - PAIN TYPE
TYPE: CHRONIC PAIN
TYPE: ACUTE PAIN

## 2023-05-19 ASSESSMENT — LIFESTYLE VARIABLES
TOTAL SCORE: 0
TOTAL SCORE: 0
EVER HAD A DRINK FIRST THING IN THE MORNING TO STEADY YOUR NERVES TO GET RID OF A HANGOVER: NO
HAVE YOU EVER FELT YOU SHOULD CUT DOWN ON YOUR DRINKING: NO
HOW MANY TIMES IN THE PAST YEAR HAVE YOU HAD 5 OR MORE DRINKS IN A DAY: 0
TOTAL SCORE: 0
EVER FELT BAD OR GUILTY ABOUT YOUR DRINKING: NO
HAVE PEOPLE ANNOYED YOU BY CRITICIZING YOUR DRINKING: NO
AVERAGE NUMBER OF DAYS PER WEEK YOU HAVE A DRINK CONTAINING ALCOHOL: 0
CONSUMPTION TOTAL: NEGATIVE
ON A TYPICAL DAY WHEN YOU DRINK ALCOHOL HOW MANY DRINKS DO YOU HAVE: 0
ALCOHOL_USE: NO
DOES PATIENT WANT TO STOP DRINKING: CANNOT ASSESS

## 2023-05-19 ASSESSMENT — ENCOUNTER SYMPTOMS
DIZZINESS: 0
HEADACHES: 0
SHORTNESS OF BREATH: 0
CHILLS: 0
NAUSEA: 0
FEVER: 0
NERVOUS/ANXIOUS: 1
COUGH: 0
HEARTBURN: 0
TREMORS: 1
ABDOMINAL PAIN: 0
PALPITATIONS: 0
DIARRHEA: 0
VOMITING: 0

## 2023-05-19 ASSESSMENT — PATIENT HEALTH QUESTIONNAIRE - PHQ9
6. FEELING BAD ABOUT YOURSELF - OR THAT YOU ARE A FAILURE OR HAVE LET YOURSELF OR YOUR FAMILY DOWN: NEARLY EVERY DAY
1. LITTLE INTEREST OR PLEASURE IN DOING THINGS: NEARLY EVERY DAY
4. FEELING TIRED OR HAVING LITTLE ENERGY: NEARLY EVERY DAY
9. THOUGHTS THAT YOU WOULD BE BETTER OFF DEAD, OR OF HURTING YOURSELF: NOT AT ALL
8. MOVING OR SPEAKING SO SLOWLY THAT OTHER PEOPLE COULD HAVE NOTICED. OR THE OPPOSITE, BEING SO FIGETY OR RESTLESS THAT YOU HAVE BEEN MOVING AROUND A LOT MORE THAN USUAL: NEARLY EVERY DAY
SUM OF ALL RESPONSES TO PHQ9 QUESTIONS 1 AND 2: 6
7. TROUBLE CONCENTRATING ON THINGS, SUCH AS READING THE NEWSPAPER OR WATCHING TELEVISION: NEARLY EVERY DAY
2. FEELING DOWN, DEPRESSED, IRRITABLE, OR HOPELESS: NEARLY EVERY DAY
SUM OF ALL RESPONSES TO PHQ QUESTIONS 1-9: 24
3. TROUBLE FALLING OR STAYING ASLEEP OR SLEEPING TOO MUCH: NEARLY EVERY DAY
5. POOR APPETITE OR OVEREATING: NEARLY EVERY DAY

## 2023-05-19 ASSESSMENT — FIBROSIS 4 INDEX
FIB4 SCORE: 1.85
FIB4 SCORE: 1.26

## 2023-05-19 NOTE — H&P
"Hospital Medicine History & Physical Note    Date of Service  5/19/2023    Primary Care Physician  LAKE Aceves    Consultants  none    Specialist Names: none    Code Status  Full Code    Chief Complaint  Chief Complaint   Patient presents with    Tremors    Anxiety       History of Presenting Illness  Juan Lubin is a 73 y.o. male who presented 5/19/2023 with anxiety and tremors.  He has a past medical history of hypertension, thyroid disease, psychiatric issues and chronic back pain.  Patient was brought in by ambulance and had called 911 stating \"he feels like he is losing his mind\" he started taking new medications 3 weeks ago including duloxetine and Seroquel.  He began to develop rigid tremors and more severe anxiety.   In the ER, he is slightly hypertensive, labs largely unremarkable.  EKG showing sinus rhythm.  He was given Versed in the field which did not help him.  He was given 1 dose of Ativan in the ER which did calm his symptoms significantly.  He will be admitted to monitor overnight for serotonin syndrome and treated with benzodiazepines.    I discussed the plan of care with patient.    Review of Systems  Review of Systems   Constitutional:  Negative for chills, fever and malaise/fatigue.   Respiratory:  Negative for cough and shortness of breath.    Cardiovascular:  Negative for chest pain, palpitations and leg swelling.   Gastrointestinal:  Negative for abdominal pain, diarrhea, heartburn, nausea and vomiting.   Genitourinary:  Negative for dysuria, frequency and urgency.   Neurological:  Positive for tremors. Negative for dizziness and headaches.   Psychiatric/Behavioral:  The patient is nervous/anxious.    All other systems reviewed and are negative.      Past Medical History   has a past medical history of Anxiety (11/16/2021), Chronic low back pain (4/30/2015), Hypogonadism, male (4/30/2015), Male erectile disorder (CODE) (4/30/2015), Moderate episode of recurrent major depressive " disorder (HCC) (11/16/2021), Other hyperlipidemia (11/16/2021), Other insomnia (11/16/2021), Other specified hypothyroidism (11/16/2021), Primary hypertension (4/30/2015), Sedative, hypnotic or anxiolytic dependence (HCC) (11/16/2021), and Uncomplicated opioid dependence (HCC) (11/16/2021).    Surgical History   has a past surgical history that includes bone spur excision (2001) and cataract extraction with iol (Right).     Family History  family history includes Alcohol abuse in his father; Drug abuse in his maternal grandfather and paternal grandfather; Liver Cancer in his mother; Lung Cancer in his brother; No Known Problems in his sister and sister; Prostate cancer in his brother.   Family history reviewed with patient. There is no family history that is pertinent to the chief complaint.     Social History   reports that he quit smoking about 37 years ago. His smoking use included cigarettes. He started smoking about 47 years ago. He smoked an average of 1.5 packs per day. He has never used smokeless tobacco. He reports that he does not currently use alcohol. He reports current drug use. Drug: Marijuana.    Allergies  No Known Allergies    Medications  Prior to Admission Medications   Prescriptions Last Dose Informant Patient Reported? Taking?   DULoxetine (CYMBALTA) 60 MG Cap DR Particles delayed-release capsule unk at [facility] Patient's Home Pharmacy No No   Sig: Take 1 Capsule by mouth every day.   QUEtiapine (SEROQUEL) 25 MG Tab unk at [facility] Patient's Home Pharmacy Yes Yes   Sig: Take 25 mg by mouth at bedtime.   hydrOXYzine pamoate (VISTARIL) 50 MG Cap unk at [facility] Patient's Home Pharmacy Yes No   Sig: Take 50 mg by mouth every 8 hours as needed.   omeprazole (PRILOSEC) 20 MG delayed-release capsule unk at [facility] Patient's Home Pharmacy No No   Sig: Take 1 Capsule by mouth every day. TAKE 30 MINUTES BEFORE BREAKFAST.   tamsulosin (FLOMAX) 0.4 MG capsule unk at [facility] Patient's Home Pharmacy No No   Sig: Take 1 Capsule by  mouth 1/2 hour after breakfast.   zolpidem (AMBIEN) 10 MG Tab unk at [facility] Patient's Home Pharmacy No No   Sig: Take 1 Tablet by mouth at bedtime as needed for Sleep for up to 180 days.      Facility-Administered Medications: None       Physical Exam  Temp:  [35.4 °C (95.7 °F)-36.3 °C (97.4 °F)] 36.1 °C (97 °F)  Pulse:  [78-94] 81  Resp:  [18] 18  BP: (118-159)/() 156/73  SpO2:  [93 %-95 %] 93 %  Blood Pressure : 118/69   Temperature: 36.3 °C (97.4 °F)   Pulse: 92   Respiration: 18   Pulse Oximetry: 94 %       Physical Exam  Vitals and nursing note reviewed.   Constitutional:       Appearance: Normal appearance. He is not ill-appearing.   HENT:      Head: Normocephalic and atraumatic.      Jaw: There is normal jaw occlusion.      Right Ear: Hearing normal.      Left Ear: Hearing normal.      Nose: Nose normal.      Mouth/Throat:      Lips: Pink.      Mouth: Mucous membranes are moist.   Eyes:      Extraocular Movements: Extraocular movements intact.      Conjunctiva/sclera: Conjunctivae normal.      Pupils: Pupils are equal, round, and reactive to light.   Neck:      Vascular: No carotid bruit.   Cardiovascular:      Rate and Rhythm: Normal rate and regular rhythm.      Pulses: Normal pulses.      Heart sounds: Normal heart sounds, S1 normal and S2 normal.   Pulmonary:      Effort: Pulmonary effort is normal.      Breath sounds: Normal breath sounds and air entry. No stridor.   Musculoskeletal:      Cervical back: Normal range of motion and neck supple.      Right lower leg: No edema.      Left lower leg: No edema.   Skin:     General: Skin is warm and dry.      Capillary Refill: Capillary refill takes less than 2 seconds.   Neurological:      General: No focal deficit present.      Mental Status: He is alert and oriented to person, place, and time. Mental status is at baseline.      Sensory: Sensation is intact.      Motor: Tremor present.   Psychiatric:         Attention and Perception: Attention and  perception normal.         Mood and Affect: Affect normal. Mood is anxious (improved).         Speech: Speech normal.         Behavior: Behavior is agitated (improved). Behavior is cooperative.         Laboratory:  Recent Labs     05/19/23  1412   WBC 7.3   RBC 5.50   HEMOGLOBIN 16.0   HEMATOCRIT 45.5   MCV 82.7   MCH 29.1   MCHC 35.2   RDW 40.0   PLATELETCT 231   MPV 10.6     Recent Labs     05/19/23  1412   SODIUM 138   POTASSIUM 4.0   CHLORIDE 103   CO2 21   GLUCOSE 166*   BUN 16   CREATININE 0.97   CALCIUM 9.4     Recent Labs     05/19/23  1412   ALTSGPT 35   ASTSGOT 24   ALKPHOSPHAT 81   TBILIRUBIN 0.8   GLUCOSE 166*         No results for input(s): NTPROBNP in the last 72 hours.      No results for input(s): TROPONINT in the last 72 hours.    Imaging:  No orders to display       EKG:  I have personally reviewed the images and compared with prior images.    Assessment/Plan:  Justification for Admission Status  I anticipate this patient is appropriate for observation status at this time because monitor overnight    Patient will need a Med/Surg bed on MEDICAL service .  The need is secondary to monitor overnight.    * Serotonin syndrome- (present on admission)  Assessment & Plan  -Discontinue duloxetine and Seroquel  -As needed benzodiazepines-with the goals to eliminate tremors, agitation and elevated blood pressure  -Oxygen supplementation as needed  -If benzodiazepines fail can try cyproheptadine      Primary hypertension- (present on admission)  Assessment & Plan  Does not take any antihypertensives at home  Monitor  IV hydralazine available as needed    Other specified hypothyroidism- (present on admission)  Assessment & Plan  -Most recent TSH from April 2023 is 2.3  -Does not take home thyroid medications  -Continue to monitor    Other insomnia- (present on admission)  Assessment & Plan  - Continue home Ambien        VTE prophylaxis: enoxaparin ppx

## 2023-05-19 NOTE — ASSESSMENT & PLAN NOTE
-Discontinue duloxetine and Seroquel  -As needed benzodiazepines-with the goals to eliminate tremors, agitation and elevated blood pressure  -Oxygen supplementation as needed  -If benzodiazepines fail can try cyproheptadine

## 2023-05-19 NOTE — ED TRIAGE NOTES
"Chief Complaint   Patient presents with    Tremors    Anxiety     BIBA for above complaint. Per EMS patient called 911. States he started to have anxiety and tremors today. Patient states \"he feels like he is losing his mind\". Patient states he started to take new medications 3 weeks ago. Patient does not medications that he is taking.  Reports that he has never had these tremors in the past but has had some anxiety.   Given 1 mg of versed by EMS. Patient states it did not make him feel better Glucose 205 by ems.   Med list that ems gave me was  Omeprazole, hydroxyzine, Duloxetine, and Quetiapine  Quetiapine is not on patients med list at this time.   "

## 2023-05-20 VITALS
SYSTOLIC BLOOD PRESSURE: 140 MMHG | HEIGHT: 72 IN | TEMPERATURE: 97.9 F | OXYGEN SATURATION: 96 % | BODY MASS INDEX: 23.59 KG/M2 | WEIGHT: 174.16 LBS | DIASTOLIC BLOOD PRESSURE: 71 MMHG | HEART RATE: 80 BPM | RESPIRATION RATE: 18 BRPM

## 2023-05-20 PROBLEM — G25.79 SEROTONIN SYNDROME: Status: RESOLVED | Noted: 2023-05-19 | Resolved: 2023-05-20

## 2023-05-20 LAB
ANION GAP SERPL CALC-SCNC: 8 MMOL/L (ref 7–16)
BUN SERPL-MCNC: 16 MG/DL (ref 8–22)
CALCIUM SERPL-MCNC: 8.4 MG/DL (ref 8.5–10.5)
CHLORIDE SERPL-SCNC: 105 MMOL/L (ref 96–112)
CO2 SERPL-SCNC: 26 MMOL/L (ref 20–33)
CREAT SERPL-MCNC: 0.93 MG/DL (ref 0.5–1.4)
ERYTHROCYTE [DISTWIDTH] IN BLOOD BY AUTOMATED COUNT: 42.6 FL (ref 35.9–50)
GFR SERPLBLD CREATININE-BSD FMLA CKD-EPI: 87 ML/MIN/1.73 M 2
GLUCOSE SERPL-MCNC: 83 MG/DL (ref 65–99)
HCT VFR BLD AUTO: 39.1 % (ref 42–52)
HGB BLD-MCNC: 13.1 G/DL (ref 14–18)
MCH RBC QN AUTO: 28.9 PG (ref 27–33)
MCHC RBC AUTO-ENTMCNC: 33.5 G/DL (ref 33.7–35.3)
MCV RBC AUTO: 86.3 FL (ref 81.4–97.8)
PLATELET # BLD AUTO: 180 K/UL (ref 164–446)
PMV BLD AUTO: 10.8 FL (ref 9–12.9)
POTASSIUM SERPL-SCNC: 4.1 MMOL/L (ref 3.6–5.5)
RBC # BLD AUTO: 4.53 M/UL (ref 4.7–6.1)
SODIUM SERPL-SCNC: 139 MMOL/L (ref 135–145)
WBC # BLD AUTO: 7.6 K/UL (ref 4.8–10.8)

## 2023-05-20 PROCEDURE — 700105 HCHG RX REV CODE 258: Performed by: EMERGENCY MEDICINE

## 2023-05-20 PROCEDURE — A9270 NON-COVERED ITEM OR SERVICE: HCPCS

## 2023-05-20 PROCEDURE — 85027 COMPLETE CBC AUTOMATED: CPT

## 2023-05-20 PROCEDURE — 700102 HCHG RX REV CODE 250 W/ 637 OVERRIDE(OP)

## 2023-05-20 PROCEDURE — 99239 HOSP IP/OBS DSCHRG MGMT >30: CPT | Performed by: HOSPITALIST

## 2023-05-20 PROCEDURE — 80048 BASIC METABOLIC PNL TOTAL CA: CPT

## 2023-05-20 PROCEDURE — 96372 THER/PROPH/DIAG INJ SC/IM: CPT

## 2023-05-20 PROCEDURE — 96376 TX/PRO/DX INJ SAME DRUG ADON: CPT

## 2023-05-20 PROCEDURE — 700111 HCHG RX REV CODE 636 W/ 250 OVERRIDE (IP)

## 2023-05-20 PROCEDURE — G0378 HOSPITAL OBSERVATION PER HR: HCPCS

## 2023-05-20 RX ADMIN — SODIUM CHLORIDE, POTASSIUM CHLORIDE, SODIUM LACTATE AND CALCIUM CHLORIDE: 600; 310; 30; 20 INJECTION, SOLUTION INTRAVENOUS at 00:05

## 2023-05-20 RX ADMIN — SODIUM CHLORIDE, POTASSIUM CHLORIDE, SODIUM LACTATE AND CALCIUM CHLORIDE: 600; 310; 30; 20 INJECTION, SOLUTION INTRAVENOUS at 04:10

## 2023-05-20 RX ADMIN — ENOXAPARIN SODIUM 30 MG: 30 INJECTION SUBCUTANEOUS at 05:17

## 2023-05-20 RX ADMIN — TAMSULOSIN HYDROCHLORIDE 0.4 MG: 0.4 CAPSULE ORAL at 08:57

## 2023-05-20 RX ADMIN — LORAZEPAM 1 MG: 2 INJECTION INTRAMUSCULAR; INTRAVENOUS at 01:29

## 2023-05-20 RX ADMIN — SENNOSIDES AND DOCUSATE SODIUM 2 TABLET: 50; 8.6 TABLET ORAL at 05:18

## 2023-05-20 RX ADMIN — OMEPRAZOLE 20 MG: 20 CAPSULE, DELAYED RELEASE ORAL at 05:18

## 2023-05-20 ASSESSMENT — PAIN DESCRIPTION - PAIN TYPE: TYPE: ACUTE PAIN

## 2023-05-20 NOTE — PROGRESS NOTES
Patient discharged to home.  Patient Requested a cab voucher- this RN texted ADAN Urszula, she will provide same.  PIVx1 removed.  Patient has personal belongings, no home meds.  No new prescriptions.  Discussed discharge instructions, patient voiced understanding.

## 2023-05-20 NOTE — DISCHARGE INSTRUCTIONS
Discharge Instructions    Discharged to home by taxi with self. Discharged via walking, hospital escort: Refused.  Special equipment needed: Not Applicable    Be sure to schedule a follow-up appointment with your primary care doctor or any specialists as instructed.     Discharge Plan:   Diet Plan: Discussed  Activity Level: Discussed  Confirmed Follow up Appointment: Patient to Call and Schedule Appointment  Confirmed Symptoms Management: Discussed  Medication Reconciliation Updated: Yes    I understand that a diet low in cholesterol, fat, and sodium is recommended for good health. Unless I have been given specific instructions below for another diet, I accept this instruction as my diet prescription.   Other diet: regular    Special Instructions: None    -Is this patient being discharged with medication to prevent blood clots?  No    Is patient discharged on Warfarin / Coumadin?   No

## 2023-05-20 NOTE — PROGRESS NOTES
Report received from ER  RN. Assume care. Pt. AAOx4 pt is bed,  Assessment completed. VSS. Visible tremosr present, Pt able to ambulate with slow steady gait and SBA, good CMS and pulses to merline LE, denies numbness and tingling. All question answered. Pt has call light within reach,  bed is in the lowest position. Pt has no other needs at this time.

## 2023-05-20 NOTE — PROGRESS NOTES
Report received from night shift RN. Assume care. Pt. AAOx4 pt is bed,  Assessment completed. VSS. Denies pain, no tremor noted,  ambulating to the bathroom with steady gait.  good CMS and pulses to merline LE, denies numbness and tingling.  Pt was update for the care for the day. White board updated, All question answered. Pt has call light within reach,  bed is in the lowest position. Pt has no other needs at this time.

## 2023-05-20 NOTE — PROGRESS NOTES
4 Eyes Skin Assessment Completed by SHARIFA Hendricks and SHARIFA Fall.    Head WDL  Ears WDL  Nose WDL  Mouth WDL  Neck WDL  Breast/Chest WDL  Shoulder Blades WDL  Spine WDL  (R) Arm/Elbow/Hand Scab  (L) Arm/Elbow/Hand WDL  Abdomen WDL  Groin WDL  Scrotum/Coccyx/Buttocks WDL  (R) Leg WDL  (L) Leg WDL  (R) Heel/Foot/Toe WDL  (L) Heel/Foot/Toe WDL          Devices In Places Blood Pressure Cuff      Interventions In Place Pressure Redistribution Mattress    Possible Skin Injury No    Pictures Uploaded Into Epic N/A  Wound Consult Placed N/A  RN Wound Prevention Protocol Ordered No

## 2023-05-20 NOTE — DISCHARGE PLANNING
ANDREW received call from SRINIVASA porras reporting pt needs a cab voucher home. Verified the home address and cab voucher given to DC lounge RN.

## 2023-05-20 NOTE — DISCHARGE PLANNING
TCN following. HTH/SCP chart review completed. Pt has dc'd to home prior to TCN ability to visit with pt. Noted per review he remained ambulatory with no AD at time of dc and was able to dc to home in cab. TCN sent email to [facility] for possible referral/follow up phone call with pt given reason for admission and possible assistance with med reconciliation/management post dc to home.

## 2023-05-20 NOTE — CARE PLAN
Problem: Knowledge Deficit - Standard  Goal: Patient and family/care givers will demonstrate understanding of plan of care, disease process/condition, diagnostic tests and medications  Outcome: Met     Problem: Depression  Goal: Patient and family/caregiver will verbalize accurate information about at least two of the possible causes of depression, three-four of the signs and symptoms of depression  Outcome: Met     Problem: Pain - Standard  Goal: Alleviation of pain or a reduction in pain to the patient’s comfort goal  Outcome: Met   The patient is Stable - Low risk of patient condition declining or worsening    Shift Goals  Clinical Goals: DC home by 1300  Patient Goals: go home  Family Goals: LUAN    Progress made toward(s) clinical / shift goals:  YEs Pt is going home in good and stable conditions    Patient is not progressing towards the following goals:

## 2023-05-20 NOTE — ED PROVIDER NOTES
ED Provider Note    CHIEF COMPLAINT  Chief Complaint   Patient presents with    Tremors    Anxiety         HPI/ROS    Juan Lubin is a 73 y.o. male who presents to the emergency department complaining of uncontrollable jerking and tremor of his extremities and body.  The patient has a history of panic attacks and apparently he was seen in the emergency department a couple of weeks ago for this and started on 4 new medications.  He does not know the name of any of the medications but states he has been taking them as prescribed.  He seemed to be doing okay until this morning when he started to experience this uncontrollable jerking of his body so he is come to the emergency department for evaluation.  We had our pharmacy technician contact the patient's pharmacy and his med list has been updated as noted below and apparently one of the new medications that he is now taking is duloxetine.  Reportedly the patient was given intravenous Versed by the EMS crew during transport and the patient said this did not help very much.    Review of systems: No fever or chills no nausea vomiting no chest pain cough or difficulty breathing.    PAST MEDICAL HISTORY   has a past medical history of Anxiety (11/16/2021), Chronic low back pain (4/30/2015), Hypogonadism, male (4/30/2015), Male erectile disorder (CODE) (4/30/2015), Moderate episode of recurrent major depressive disorder (HCC) (11/16/2021), Other hyperlipidemia (11/16/2021), Other insomnia (11/16/2021), Other specified hypothyroidism (11/16/2021), Primary hypertension (4/30/2015), Sedative, hypnotic or anxiolytic dependence (HCC) (11/16/2021), and Uncomplicated opioid dependence (HCC) (11/16/2021).    SURGICAL HISTORY   has a past surgical history that includes bone spur excision (2001) and cataract extraction with iol (Right).    FAMILY HISTORY  Family History   Problem Relation Age of Onset    Liver Cancer Mother     Alcohol abuse Father     No Known Problems Sister      No Known Problems Sister     Lung Cancer Brother     Prostate cancer Brother     Drug abuse Maternal Grandfather     Drug abuse Paternal Grandfather        SOCIAL HISTORY  Social History     Tobacco Use    Smoking status: Former     Packs/day: 1.50     Types: Cigarettes     Start date:      Quit date:      Years since quittin.4    Smokeless tobacco: Never   Vaping Use    Vaping Use: Never used   Substance and Sexual Activity    Alcohol use: Not Currently    Drug use: Yes     Types: Marijuana     Comment: occasionally    Sexual activity: Not Currently       CURRENT MEDICATIONS  Home Medications       Reviewed by Radha Mesa (Pharmacy Tech) on 23 at 1505  Med List Status: Complete     Medication Last Dose Status   DULoxetine (CYMBALTA) 60 MG Cap DR Particles delayed-release capsule unk Active   hydrOXYzine pamoate (VISTARIL) 50 MG Cap unk Active   omeprazole (PRILOSEC) 20 MG delayed-release capsule unk Active   QUEtiapine (SEROQUEL) 25 MG Tab unk Active   tamsulosin (FLOMAX) 0.4 MG capsule unk Active   zolpidem (AMBIEN) 10 MG Tab unk Active                    ALLERGIES  No Known Allergies    PHYSICAL EXAM  VITAL SIGNS: BP (!) 156/73   Pulse 81   Temp 36.1 °C (97 °F) (Temporal)   Resp 18   Ht 1.829 m (6')   Wt 79 kg (174 lb 2.6 oz)   SpO2 93%   BMI 23.62 kg/m²    Constitutional: The patient is awake lucid and verbal  HENT: No sign of acute trauma to the head  Eyes: Pupils round and reactive extraocular motion present  Neck: Trachea midline no JVD  Cardiovascular: Regular tachycardia at the time of my initial examination  Respiratory: Clear bilaterally with no apparent difficulty breathing  Skin: Warm and dry  Musculoskeletal: No acute bony deformity  Neurologic: Patient is awake and verbal he definitely has frequent jerking movements and myoclonic activity of the upper or lower extremities and trunk, this does not look like a typical tremor.  The patient cannot control  this.  Psychiatric:.  Awake lucid and verbal he is mildly anxious at the time of arrival    DIAGNOSTIC STUDIES / PROCEDURES  EKG  I have independently interpreted this EKG  Twelve-lead EKG shows sinus rhythm 83 bpm there is a left axis deviation there is no pathologic ST elevation or depression there is PVC on the tracing CT interval is 183 ms QTc interval 467 ms    LABS  CBC shows white blood cell count of 7.3 hemoglobin is adequate at 16 basic metabolic panel is unremarkable except for an elevated blood sugar of 166 total CPK is 71 blood alcohol level less than 10.1      COURSE & MEDICAL DECISION MAKING  In the emergency department an IV was established and the patient placed on the cardiac monitor, he was given intravenous Ativan 1 mg and this was quite helpful but he does continue to have some jerking movements of his extremities and torso and an additional 1 mg has been ordered.  Serotonin syndrome is definitely within the differential diagnosis given the patient was recently started on duloxetine therefore I have reviewed the case with the hospitalist and the patient is referred to the hospitalist service for further evaluation and treatment    FINAL DIAGNOSIS  1. Serotonin syndrome           Electronically signed by: Eleno Gonzalez M.D., 5/19/2023 6:15 PM

## 2023-05-20 NOTE — ASSESSMENT & PLAN NOTE
-Most recent TSH from April 2023 is 2.3  -Does not take home thyroid medications  -Continue to monitor

## 2023-05-20 NOTE — CARE PLAN
The patient is Stable - Low risk of patient condition declining or worsening    Shift Goals  Clinical Goals: IV fluids, monitor labs, rest  Patient Goals: Rest  Family Goals: LUAN      Problem: Knowledge Deficit - Standard  Goal: Patient and family/care givers will demonstrate understanding of plan of care, disease process/condition, diagnostic tests and medications  Outcome: Progressing     Problem: Depression  Goal: Patient and family/caregiver will verbalize accurate information about at least two of the possible causes of depression, three-four of the signs and symptoms of depression  Outcome: Progressing     Problem: Pain - Standard  Goal: Alleviation of pain or a reduction in pain to the patient’s comfort goal  Outcome: Progressing

## 2023-05-21 NOTE — DISCHARGE SUMMARY
Discharge Summary    CHIEF COMPLAINT ON ADMISSION  Chief Complaint   Patient presents with    Tremors    Anxiety       Reason for Admission  EMS     Admission Date  5/19/2023    CODE STATUS  Prior    HPI & HOSPITAL COURSE  71-year-old male past medical hypertension, BPH, chronic back pain presents to the emergency department with complaints of tremors and anxiety.  Patient apparently is taking Seroquel and Cymbalta recently started.  No chest pain or shortness of breath.  no Fever or chills.  no Focal deficits    Patient was hospitalized.  We discontinued Cymbalta and Seroquel .    We were using Ativan as necessary for tremors or anxiety     patient's symptoms resolved.  Patient was stable for discharge on 5/20/2023 to follow-up with his PCP for further care management in regards to further plans for Cymbalta and Seroquel       Therefore, he is discharged in good and stable condition to home with close outpatient follow-up.    The patient recovered much more quickly than anticipated on admission.    Discharge Date  5/20/2023    FOLLOW UP ITEMS POST DISCHARGE      DISCHARGE DIAGNOSES  Principal Problem (Resolved):    Serotonin syndrome (POA: Yes)  Active Problems:    Primary hypertension (POA: Yes)    Other insomnia (Chronic) (POA: Yes)    Other specified hypothyroidism (POA: Yes)      FOLLOW UP  Future Appointments   Date Time Provider Department Center   6/29/2023 10:00 AM [facility]     No follow-up provider specified.    MEDICATIONS ON DISCHARGE     Medication List        CONTINUE taking these medications        Instructions   hydrOXYzine pamoate 50 MG Caps  Commonly known as: VISTARIL   Take 50 mg by mouth every 8 hours as needed.  Dose: 50 mg     omeprazole 20 MG delayed-release capsule  Commonly known as: PRILOSEC   Take 1 Capsule by mouth every day. TAKE 30 MINUTES BEFORE BREAKFAST.  Dose: 20 mg     tamsulosin 0.4 MG capsule  Commonly known as: FLOMAX   Take 1 Capsule by mouth 1/2 hour  after breakfast.  Dose: 0.4 mg     zolpidem 10 MG Tabs  Commonly known as: AMBIEN   Take 1 Tablet by mouth at bedtime as needed for Sleep for up to 180 days.  Dose: 10 mg            STOP taking these medications      QUEtiapine 25 MG Tabs  Commonly known as: Seroquel              Allergies  No Known Allergies    DIET  No orders of the defined types were placed in this encounter.      ACTIVITY  As tolerated.  Weight bearing as tolerated    CONSULTATIONS      PROCEDURES      LABORATORY  Lab Results   Component Value Date    SODIUM 139 05/20/2023    POTASSIUM 4.1 05/20/2023    CHLORIDE 105 05/20/2023    CO2 26 05/20/2023    GLUCOSE 83 05/20/2023    BUN 16 05/20/2023    CREATININE 0.93 05/20/2023        Lab Results   Component Value Date    WBC 7.6 05/20/2023    HEMOGLOBIN 13.1 (L) 05/20/2023    HEMATOCRIT 39.1 (L) 05/20/2023    PLATELETCT 180 05/20/2023        Total time of the discharge process exceeds 39  minutes.

## 2023-05-23 PROBLEM — F41.9 ANXIETY: Chronic | Status: ACTIVE | Noted: 2021-11-16

## 2023-05-23 PROBLEM — Z74.1 REQUIRES ASSISTANCE WITH ACTIVITIES OF DAILY LIVING (ADL): Status: ACTIVE | Noted: 2023-05-23

## 2023-05-23 PROBLEM — Z13.79 GENETIC TESTING: Status: ACTIVE | Noted: 2023-05-23

## 2023-05-23 PROBLEM — K59.03 DRUG-INDUCED CONSTIPATION: Status: ACTIVE | Noted: 2023-05-23

## 2023-06-22 ENCOUNTER — HOSPITAL ENCOUNTER (OUTPATIENT)
Dept: LAB | Facility: MEDICAL CENTER | Age: 73
End: 2023-06-22
Payer: MEDICARE

## 2023-06-22 DIAGNOSIS — R53.83 FATIGUE, UNSPECIFIED TYPE: ICD-10-CM

## 2023-06-22 DIAGNOSIS — R73.01 IMPAIRED FASTING GLUCOSE: ICD-10-CM

## 2023-06-22 DIAGNOSIS — E29.1 HYPOGONADISM, MALE: Chronic | ICD-10-CM

## 2023-06-22 DIAGNOSIS — E78.49 OTHER HYPERLIPIDEMIA: ICD-10-CM

## 2023-06-22 DIAGNOSIS — E55.9 VITAMIN D DEFICIENCY: ICD-10-CM

## 2023-06-22 LAB
25(OH)D3 SERPL-MCNC: 39 NG/ML (ref 30–100)
ALBUMIN SERPL BCP-MCNC: 4.5 G/DL (ref 3.2–4.9)
ALBUMIN/GLOB SERPL: 2 G/DL
ALP SERPL-CCNC: 86 U/L (ref 30–99)
ALT SERPL-CCNC: 26 U/L (ref 2–50)
ANION GAP SERPL CALC-SCNC: 10 MMOL/L (ref 7–16)
AST SERPL-CCNC: 21 U/L (ref 12–45)
BASOPHILS # BLD AUTO: 1.7 % (ref 0–1.8)
BASOPHILS # BLD: 0.12 K/UL (ref 0–0.12)
BILIRUB SERPL-MCNC: 0.5 MG/DL (ref 0.1–1.5)
BUN SERPL-MCNC: 14 MG/DL (ref 8–22)
CALCIUM ALBUM COR SERPL-MCNC: 9.2 MG/DL (ref 8.5–10.5)
CALCIUM SERPL-MCNC: 9.6 MG/DL (ref 8.5–10.5)
CHLORIDE SERPL-SCNC: 99 MMOL/L (ref 96–112)
CHOLEST SERPL-MCNC: 211 MG/DL (ref 100–199)
CO2 SERPL-SCNC: 30 MMOL/L (ref 20–33)
CREAT SERPL-MCNC: 0.96 MG/DL (ref 0.5–1.4)
CREAT UR-MCNC: 78.68 MG/DL
EOSINOPHIL # BLD AUTO: 0.4 K/UL (ref 0–0.51)
EOSINOPHIL NFR BLD: 5.7 % (ref 0–6.9)
ERYTHROCYTE [DISTWIDTH] IN BLOOD BY AUTOMATED COUNT: 39 FL (ref 35.9–50)
EST. AVERAGE GLUCOSE BLD GHB EST-MCNC: 114 MG/DL
FOLATE SERPL-MCNC: 24.6 NG/ML
GFR SERPLBLD CREATININE-BSD FMLA CKD-EPI: 84 ML/MIN/1.73 M 2
GLOBULIN SER CALC-MCNC: 2.2 G/DL (ref 1.9–3.5)
GLUCOSE SERPL-MCNC: 101 MG/DL (ref 65–99)
HBA1C MFR BLD: 5.6 % (ref 4–5.6)
HCT VFR BLD AUTO: 46.2 % (ref 42–52)
HDLC SERPL-MCNC: 44 MG/DL
HGB BLD-MCNC: 15.8 G/DL (ref 14–18)
IMM GRANULOCYTES # BLD AUTO: 0.02 K/UL (ref 0–0.11)
IMM GRANULOCYTES NFR BLD AUTO: 0.3 % (ref 0–0.9)
LDLC SERPL CALC-MCNC: 145 MG/DL
LYMPHOCYTES # BLD AUTO: 3.37 K/UL (ref 1–4.8)
LYMPHOCYTES NFR BLD: 47.7 % (ref 22–41)
MCH RBC QN AUTO: 29.2 PG (ref 27–33)
MCHC RBC AUTO-ENTMCNC: 34.2 G/DL (ref 32.3–36.5)
MCV RBC AUTO: 85.2 FL (ref 81.4–97.8)
MICROALBUMIN UR-MCNC: <1.2 MG/DL
MICROALBUMIN/CREAT UR: NORMAL MG/G (ref 0–30)
MONOCYTES # BLD AUTO: 0.56 K/UL (ref 0–0.85)
MONOCYTES NFR BLD AUTO: 7.9 % (ref 0–13.4)
NEUTROPHILS # BLD AUTO: 2.59 K/UL (ref 1.82–7.42)
NEUTROPHILS NFR BLD: 36.7 % (ref 44–72)
NRBC # BLD AUTO: 0 K/UL
NRBC BLD-RTO: 0 /100 WBC (ref 0–0.2)
PLATELET # BLD AUTO: 182 K/UL (ref 164–446)
PMV BLD AUTO: 11.4 FL (ref 9–12.9)
POTASSIUM SERPL-SCNC: 4 MMOL/L (ref 3.6–5.5)
PROT SERPL-MCNC: 6.7 G/DL (ref 6–8.2)
RBC # BLD AUTO: 5.42 M/UL (ref 4.7–6.1)
SODIUM SERPL-SCNC: 139 MMOL/L (ref 135–145)
T3FREE SERPL-MCNC: 3.2 PG/ML (ref 2–4.4)
T4 FREE SERPL-MCNC: 0.96 NG/DL (ref 0.93–1.7)
TRIGL SERPL-MCNC: 109 MG/DL (ref 0–149)
TSH SERPL DL<=0.005 MIU/L-ACNC: 8.14 UIU/ML (ref 0.38–5.33)
VIT B12 SERPL-MCNC: 995 PG/ML (ref 211–911)
WBC # BLD AUTO: 7.1 K/UL (ref 4.8–10.8)

## 2023-06-22 PROCEDURE — 84439 ASSAY OF FREE THYROXINE: CPT

## 2023-06-22 PROCEDURE — 84270 ASSAY OF SEX HORMONE GLOBUL: CPT

## 2023-06-22 PROCEDURE — 84481 FREE ASSAY (FT-3): CPT

## 2023-06-22 PROCEDURE — 82746 ASSAY OF FOLIC ACID SERUM: CPT

## 2023-06-22 PROCEDURE — 84443 ASSAY THYROID STIM HORMONE: CPT

## 2023-06-22 PROCEDURE — 84403 ASSAY OF TOTAL TESTOSTERONE: CPT | Mod: 91

## 2023-06-22 PROCEDURE — 85025 COMPLETE CBC W/AUTO DIFF WBC: CPT

## 2023-06-22 PROCEDURE — 84681 ASSAY OF C-PEPTIDE: CPT

## 2023-06-22 PROCEDURE — 82043 UR ALBUMIN QUANTITATIVE: CPT

## 2023-06-22 PROCEDURE — 82570 ASSAY OF URINE CREATININE: CPT

## 2023-06-22 PROCEDURE — 80061 LIPID PANEL: CPT

## 2023-06-22 PROCEDURE — 36415 COLL VENOUS BLD VENIPUNCTURE: CPT

## 2023-06-22 PROCEDURE — 84402 ASSAY OF FREE TESTOSTERONE: CPT | Mod: 91

## 2023-06-22 PROCEDURE — 82607 VITAMIN B-12: CPT

## 2023-06-22 PROCEDURE — 80053 COMPREHEN METABOLIC PANEL: CPT

## 2023-06-22 PROCEDURE — 83036 HEMOGLOBIN GLYCOSYLATED A1C: CPT

## 2023-06-22 PROCEDURE — 82306 VITAMIN D 25 HYDROXY: CPT

## 2023-06-23 LAB
C PEPTIDE SERPL-MCNC: 2 NG/ML (ref 0.5–3.3)
TESTOST FREE MFR SERPL: 0.7 % (ref 1.6–2.9)
TESTOST FREE SERPL-MCNC: 17 PG/ML (ref 47–244)
TESTOST SERPL-MCNC: 232 NG/DL (ref 300–720)

## 2023-06-28 LAB
SHBG SERPL-SCNC: 113 NMOL/L (ref 19–76)
TESTOST FREE SERPL-MCNC: 15.8 PG/ML (ref 47–244)
TESTOST SERPL-MCNC: 213 NG/DL (ref 300–720)
TESTOSTERONE.FREE+WB SERPL-MCNC: 44 NG/DL (ref 130–680)

## 2023-06-29 PROBLEM — H53.8 BLURRED VISION, LEFT EYE: Status: ACTIVE | Noted: 2023-06-29

## 2023-07-03 PROBLEM — F51.8 ABNORMAL DREAMS: Status: ACTIVE | Noted: 2023-07-03

## 2023-07-03 PROBLEM — E78.49 OTHER HYPERLIPIDEMIA: Chronic | Status: ACTIVE | Noted: 2021-11-16

## 2023-07-03 PROBLEM — F33.42 RECURRENT MAJOR DEPRESSIVE DISORDER, IN FULL REMISSION (HCC): Chronic | Status: ACTIVE | Noted: 2021-11-16

## 2023-07-03 PROBLEM — F33.42 RECURRENT MAJOR DEPRESSIVE DISORDER, IN FULL REMISSION (HCC): Status: ACTIVE | Noted: 2021-11-16

## 2023-07-03 PROBLEM — F33.1 MODERATE EPISODE OF RECURRENT MAJOR DEPRESSIVE DISORDER (HCC): Chronic | Status: ACTIVE | Noted: 2021-11-16

## 2023-07-03 PROBLEM — Z74.1 REQUIRES ASSISTANCE WITH ACTIVITIES OF DAILY LIVING (ADL): Status: RESOLVED | Noted: 2023-05-23 | Resolved: 2023-07-03

## 2023-08-25 ENCOUNTER — TELEPHONE (OUTPATIENT)
Dept: HEALTH INFORMATION MANAGEMENT | Facility: OTHER | Age: 73
End: 2023-08-25
Payer: MEDICARE

## 2023-09-14 PROBLEM — F33.9 RECURRENT MAJOR DEPRESSION (HCC): Status: ACTIVE | Noted: 2021-11-16
